# Patient Record
Sex: MALE | Race: WHITE | Employment: OTHER | URBAN - NONMETROPOLITAN AREA
[De-identification: names, ages, dates, MRNs, and addresses within clinical notes are randomized per-mention and may not be internally consistent; named-entity substitution may affect disease eponyms.]

---

## 2020-09-09 ENCOUNTER — APPOINTMENT (OUTPATIENT)
Dept: GENERAL RADIOLOGY | Age: 55
End: 2020-09-09
Payer: MEDICAID

## 2020-09-09 ENCOUNTER — HOSPITAL ENCOUNTER (EMERGENCY)
Age: 55
Discharge: HOME | End: 2020-09-09
Attending: PEDIATRICS
Payer: MEDICAID

## 2020-09-09 VITALS
DIASTOLIC BLOOD PRESSURE: 72 MMHG | HEART RATE: 79 BPM | WEIGHT: 30 LBS | BODY MASS INDEX: 4.3 KG/M2 | RESPIRATION RATE: 15 BRPM | TEMPERATURE: 98 F | OXYGEN SATURATION: 92 % | HEIGHT: 70 IN | SYSTOLIC BLOOD PRESSURE: 129 MMHG

## 2020-09-09 PROCEDURE — 93005 ELECTROCARDIOGRAM TRACING: CPT | Performed by: EMERGENCY MEDICINE

## 2020-09-09 PROCEDURE — 99285 EMERGENCY DEPT VISIT HI MDM: CPT

## 2020-09-09 PROCEDURE — 71101 X-RAY EXAM UNILAT RIBS/CHEST: CPT

## 2020-09-09 PROCEDURE — 99999 PR OFFICE/OUTPT VISIT,PROCEDURE ONLY: CPT | Performed by: PEDIATRICS

## 2020-09-09 RX ORDER — IBUPROFEN 800 MG/1
800 TABLET ORAL EVERY 6 HOURS PRN
Qty: 20 TABLET | Refills: 0 | Status: SHIPPED | OUTPATIENT
Start: 2020-09-09

## 2020-09-09 ASSESSMENT — PAIN DESCRIPTION - LOCATION: LOCATION: RIB CAGE

## 2020-09-09 ASSESSMENT — PAIN SCALES - GENERAL: PAINLEVEL_OUTOF10: 4

## 2020-09-09 ASSESSMENT — PAIN DESCRIPTION - ORIENTATION: ORIENTATION: RIGHT

## 2020-09-10 LAB
EKG P AXIS: 68 DEGREES
EKG P-R INTERVAL: 142 MS
EKG Q-T INTERVAL: 352 MS
EKG QRS DURATION: 88 MS
EKG QTC CALCULATION (BAZETT): 408 MS
EKG T AXIS: 53 DEGREES

## 2020-09-10 ASSESSMENT — ENCOUNTER SYMPTOMS
RHINORRHEA: 0
BACK PAIN: 0
COLOR CHANGE: 0
COUGH: 0
VOMITING: 0
EYE DISCHARGE: 0
SHORTNESS OF BREATH: 0
ABDOMINAL PAIN: 0
NAUSEA: 0

## 2020-09-10 NOTE — ED NOTES
Bed: 17  Expected date: 9/9/20  Expected time:   Means of arrival: [facility]  Comments:  [facility] rib pain x 3 days     Trevor Colmenares RN  09/09/20 2001
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
## 2021-05-14 ENCOUNTER — APPOINTMENT (OUTPATIENT)
Dept: CT IMAGING | Facility: HOSPITAL | Age: 56
End: 2021-05-14

## 2021-05-14 ENCOUNTER — HOSPITAL ENCOUNTER (EMERGENCY)
Facility: HOSPITAL | Age: 56
Discharge: HOME | End: 2021-05-14
Attending: EMERGENCY MEDICINE | Admitting: EMERGENCY MEDICINE

## 2021-05-14 VITALS
HEART RATE: 68 BPM | WEIGHT: 246 LBS | RESPIRATION RATE: 15 BRPM | HEIGHT: 71 IN | DIASTOLIC BLOOD PRESSURE: 85 MMHG | BODY MASS INDEX: 34.44 KG/M2 | TEMPERATURE: 98 F | OXYGEN SATURATION: 97 % | SYSTOLIC BLOOD PRESSURE: 143 MMHG

## 2021-05-14 DIAGNOSIS — K42.9 PERIUMBILICAL HERNIA: Primary | ICD-10-CM

## 2021-05-14 DIAGNOSIS — K40.90 UNILATERAL INGUINAL HERNIA WITHOUT OBSTRUCTION OR GANGRENE, RECURRENCE NOT SPECIFIED: ICD-10-CM

## 2021-05-14 LAB
ALBUMIN SERPL-MCNC: 3.6 G/DL (ref 3.5–5.2)
ALBUMIN/GLOB SERPL: 1.2 G/DL
ALP SERPL-CCNC: 79 U/L (ref 39–117)
ALT SERPL W P-5'-P-CCNC: 31 U/L (ref 1–41)
ANION GAP SERPL CALCULATED.3IONS-SCNC: 7 MMOL/L (ref 5–15)
AST SERPL-CCNC: 35 U/L (ref 1–40)
BASOPHILS # BLD AUTO: 0.05 10*3/MM3 (ref 0–0.2)
BASOPHILS NFR BLD AUTO: 0.6 % (ref 0–1.5)
BILIRUB SERPL-MCNC: 0.7 MG/DL (ref 0–1.2)
BUN SERPL-MCNC: 16 MG/DL (ref 6–20)
BUN/CREAT SERPL: 16.2 (ref 7–25)
CALCIUM SPEC-SCNC: 9 MG/DL (ref 8.6–10.5)
CHLORIDE SERPL-SCNC: 106 MMOL/L (ref 98–107)
CO2 SERPL-SCNC: 26 MMOL/L (ref 22–29)
CREAT SERPL-MCNC: 0.99 MG/DL (ref 0.76–1.27)
D-LACTATE SERPL-SCNC: 0.8 MMOL/L (ref 0.5–2)
DEPRECATED RDW RBC AUTO: 42.7 FL (ref 37–54)
EOSINOPHIL # BLD AUTO: 0.14 10*3/MM3 (ref 0–0.4)
EOSINOPHIL NFR BLD AUTO: 1.6 % (ref 0.3–6.2)
ERYTHROCYTE [DISTWIDTH] IN BLOOD BY AUTOMATED COUNT: 13.1 % (ref 12.3–15.4)
GFR SERPL CREATININE-BSD FRML MDRD: 78 ML/MIN/1.73
GLOBULIN UR ELPH-MCNC: 3.1 GM/DL
GLUCOSE SERPL-MCNC: 98 MG/DL (ref 65–99)
HCT VFR BLD AUTO: 47.3 % (ref 37.5–51)
HGB BLD-MCNC: 16 G/DL (ref 13–17.7)
IMM GRANULOCYTES # BLD AUTO: 0.03 10*3/MM3 (ref 0–0.05)
IMM GRANULOCYTES NFR BLD AUTO: 0.3 % (ref 0–0.5)
LIPASE SERPL-CCNC: 44 U/L (ref 13–60)
LYMPHOCYTES # BLD AUTO: 2.06 10*3/MM3 (ref 0.7–3.1)
LYMPHOCYTES NFR BLD AUTO: 23.1 % (ref 19.6–45.3)
MCH RBC QN AUTO: 30 PG (ref 26.6–33)
MCHC RBC AUTO-ENTMCNC: 33.8 G/DL (ref 31.5–35.7)
MCV RBC AUTO: 88.7 FL (ref 79–97)
MONOCYTES # BLD AUTO: 0.57 10*3/MM3 (ref 0.1–0.9)
MONOCYTES NFR BLD AUTO: 6.4 % (ref 5–12)
NEUTROPHILS NFR BLD AUTO: 6.06 10*3/MM3 (ref 1.7–7)
NEUTROPHILS NFR BLD AUTO: 68 % (ref 42.7–76)
NRBC BLD AUTO-RTO: 0 /100 WBC (ref 0–0.2)
PLATELET # BLD AUTO: 175 10*3/MM3 (ref 140–450)
PMV BLD AUTO: 11.3 FL (ref 6–12)
POTASSIUM SERPL-SCNC: 4.2 MMOL/L (ref 3.5–5.2)
PROT SERPL-MCNC: 6.7 G/DL (ref 6–8.5)
RBC # BLD AUTO: 5.33 10*6/MM3 (ref 4.14–5.8)
SODIUM SERPL-SCNC: 139 MMOL/L (ref 136–145)
WBC # BLD AUTO: 8.91 10*3/MM3 (ref 3.4–10.8)

## 2021-05-14 PROCEDURE — 80053 COMPREHEN METABOLIC PANEL: CPT | Performed by: EMERGENCY MEDICINE

## 2021-05-14 PROCEDURE — 83690 ASSAY OF LIPASE: CPT | Performed by: EMERGENCY MEDICINE

## 2021-05-14 PROCEDURE — 25010000002 IOPAMIDOL 61 % SOLUTION: Performed by: EMERGENCY MEDICINE

## 2021-05-14 PROCEDURE — 74177 CT ABD & PELVIS W/CONTRAST: CPT

## 2021-05-14 PROCEDURE — 99283 EMERGENCY DEPT VISIT LOW MDM: CPT

## 2021-05-14 PROCEDURE — 83605 ASSAY OF LACTIC ACID: CPT | Performed by: EMERGENCY MEDICINE

## 2021-05-14 PROCEDURE — 85025 COMPLETE CBC W/AUTO DIFF WBC: CPT | Performed by: EMERGENCY MEDICINE

## 2021-05-14 RX ORDER — ACETAMINOPHEN 500 MG
1000 TABLET ORAL EVERY 6 HOURS PRN
Qty: 25 TABLET | Refills: 0 | Status: SHIPPED | OUTPATIENT
Start: 2021-05-14 | End: 2021-05-21

## 2021-05-14 RX ORDER — SODIUM CHLORIDE 0.9 % (FLUSH) 0.9 %
10 SYRINGE (ML) INJECTION AS NEEDED
Status: DISCONTINUED | OUTPATIENT
Start: 2021-05-14 | End: 2021-05-14 | Stop reason: HOSPADM

## 2021-05-14 RX ORDER — IBUPROFEN 800 MG/1
800 TABLET ORAL EVERY 8 HOURS PRN
Qty: 25 TABLET | Refills: 0 | Status: SHIPPED | OUTPATIENT
Start: 2021-05-14 | End: 2021-05-22

## 2021-05-14 RX ADMIN — SODIUM CHLORIDE, POTASSIUM CHLORIDE, SODIUM LACTATE AND CALCIUM CHLORIDE 1000 ML: 600; 310; 30; 20 INJECTION, SOLUTION INTRAVENOUS at 13:35

## 2021-05-14 RX ADMIN — IOPAMIDOL 100 ML: 612 INJECTION, SOLUTION INTRAVENOUS at 14:26

## 2021-06-03 ENCOUNTER — TELEPHONE (OUTPATIENT)
Dept: INTERNAL MEDICINE | Facility: CLINIC | Age: 56
End: 2021-06-03

## 2021-06-03 ENCOUNTER — TRANSCRIBE ORDERS (OUTPATIENT)
Dept: ADMINISTRATIVE | Facility: HOSPITAL | Age: 56
End: 2021-06-03

## 2021-06-03 NOTE — TELEPHONE ENCOUNTER
Caller: Arsen Calderón    Relationship to patient: Self    Best call back number: [phone]     Patient is needing: PATIENT CALLED HAS A NEW PATIENT APPT 06/04. PATIENT HAS TO HAVE A PAPER FAXED TO THE  OFFICE PROOF OF HIS UPCOMING APPT, IF POSSIBLE.     LORNE RODRIGUES  FAX: [phone]

## 2021-06-04 ENCOUNTER — ANESTHESIA EVENT (OUTPATIENT)
Dept: PERIOP | Facility: HOSPITAL | Age: 56
End: 2021-06-04

## 2021-06-07 ENCOUNTER — TRANSCRIBE ORDERS (OUTPATIENT)
Dept: ADMINISTRATIVE | Facility: HOSPITAL | Age: 56
End: 2021-06-07

## 2021-06-07 ENCOUNTER — APPOINTMENT (OUTPATIENT)
Dept: LAB | Facility: HOSPITAL | Age: 56
End: 2021-06-07

## 2021-06-07 DIAGNOSIS — Z11.59 SCREENING FOR VIRAL DISEASE: Primary | ICD-10-CM

## 2021-06-08 ENCOUNTER — APPOINTMENT (OUTPATIENT)
Dept: LAB | Facility: HOSPITAL | Age: 56
End: 2021-06-08

## 2021-06-09 ENCOUNTER — OFFICE VISIT (OUTPATIENT)
Dept: INTERNAL MEDICINE | Facility: CLINIC | Age: 56
End: 2021-06-09

## 2021-06-09 ENCOUNTER — LAB (OUTPATIENT)
Dept: LAB | Facility: HOSPITAL | Age: 56
End: 2021-06-09

## 2021-06-09 ENCOUNTER — PRE-ADMISSION TESTING (OUTPATIENT)
Dept: PREADMISSION TESTING | Facility: HOSPITAL | Age: 56
End: 2021-06-09

## 2021-06-09 VITALS
OXYGEN SATURATION: 98 % | RESPIRATION RATE: 20 BRPM | WEIGHT: 248.9 LBS | SYSTOLIC BLOOD PRESSURE: 153 MMHG | HEIGHT: 70 IN | HEART RATE: 72 BPM | DIASTOLIC BLOOD PRESSURE: 84 MMHG | BODY MASS INDEX: 35.63 KG/M2

## 2021-06-09 VITALS
RESPIRATION RATE: 17 BRPM | SYSTOLIC BLOOD PRESSURE: 160 MMHG | HEIGHT: 70 IN | OXYGEN SATURATION: 98 % | TEMPERATURE: 97.5 F | BODY MASS INDEX: 35.75 KG/M2 | WEIGHT: 249.7 LBS | HEART RATE: 71 BPM | DIASTOLIC BLOOD PRESSURE: 92 MMHG

## 2021-06-09 DIAGNOSIS — M06.9 RHEUMATOID ARTHRITIS, INVOLVING UNSPECIFIED SITE, UNSPECIFIED WHETHER RHEUMATOID FACTOR PRESENT (HCC): ICD-10-CM

## 2021-06-09 DIAGNOSIS — F20.0 PARANOID SCHIZOPHRENIA (HCC): ICD-10-CM

## 2021-06-09 DIAGNOSIS — E03.9 HYPOTHYROIDISM, UNSPECIFIED TYPE: ICD-10-CM

## 2021-06-09 DIAGNOSIS — F31.9 BIPOLAR 1 DISORDER (HCC): ICD-10-CM

## 2021-06-09 DIAGNOSIS — B18.2 CHRONIC HEPATITIS C WITHOUT HEPATIC COMA (HCC): ICD-10-CM

## 2021-06-09 DIAGNOSIS — I10 ESSENTIAL HYPERTENSION: Primary | ICD-10-CM

## 2021-06-09 DIAGNOSIS — Z01.818 PRE-OP EXAM: ICD-10-CM

## 2021-06-09 DIAGNOSIS — I10 ESSENTIAL HYPERTENSION: ICD-10-CM

## 2021-06-09 DIAGNOSIS — J44.9 CHRONIC OBSTRUCTIVE PULMONARY DISEASE, UNSPECIFIED COPD TYPE (HCC): ICD-10-CM

## 2021-06-09 DIAGNOSIS — F44.81: ICD-10-CM

## 2021-06-09 LAB
ALBUMIN SERPL-MCNC: 3.8 G/DL (ref 3.5–5.2)
ALBUMIN/GLOB SERPL: 1.3 G/DL
ALP SERPL-CCNC: 98 U/L (ref 39–117)
ALT SERPL W P-5'-P-CCNC: 35 U/L (ref 1–41)
ANION GAP SERPL CALCULATED.3IONS-SCNC: 10 MMOL/L (ref 5–15)
AST SERPL-CCNC: 31 U/L (ref 1–40)
BASOPHILS # BLD AUTO: 0.05 10*3/MM3 (ref 0–0.2)
BASOPHILS NFR BLD AUTO: 0.6 % (ref 0–1.5)
BILIRUB SERPL-MCNC: 0.3 MG/DL (ref 0–1.2)
BUN SERPL-MCNC: 13 MG/DL (ref 6–20)
BUN/CREAT SERPL: 14.9 (ref 7–25)
CALCIUM SPEC-SCNC: 9.4 MG/DL (ref 8.6–10.5)
CHLORIDE SERPL-SCNC: 105 MMOL/L (ref 98–107)
CHOLEST SERPL-MCNC: 114 MG/DL (ref 0–200)
CO2 SERPL-SCNC: 26 MMOL/L (ref 22–29)
CREAT SERPL-MCNC: 0.87 MG/DL (ref 0.76–1.27)
CRP SERPL-MCNC: <0.3 MG/DL (ref 0–0.5)
DEPRECATED RDW RBC AUTO: 43.2 FL (ref 37–54)
EOSINOPHIL # BLD AUTO: 0.25 10*3/MM3 (ref 0–0.4)
EOSINOPHIL NFR BLD AUTO: 3.2 % (ref 0.3–6.2)
ERYTHROCYTE [DISTWIDTH] IN BLOOD BY AUTOMATED COUNT: 13.1 % (ref 12.3–15.4)
ERYTHROCYTE [SEDIMENTATION RATE] IN BLOOD: 2 MM/HR (ref 0–15)
GFR SERPL CREATININE-BSD FRML MDRD: 91 ML/MIN/1.73
GLOBULIN UR ELPH-MCNC: 3 GM/DL
GLUCOSE SERPL-MCNC: 189 MG/DL (ref 65–99)
HBA1C MFR BLD: 5.2 % (ref 4.8–5.6)
HCT VFR BLD AUTO: 51.4 % (ref 37.5–51)
HDLC SERPL-MCNC: 42 MG/DL (ref 40–60)
HGB BLD-MCNC: 17.3 G/DL (ref 13–17.7)
IMM GRANULOCYTES # BLD AUTO: 0.03 10*3/MM3 (ref 0–0.05)
IMM GRANULOCYTES NFR BLD AUTO: 0.4 % (ref 0–0.5)
LDLC SERPL CALC-MCNC: 55 MG/DL (ref 0–100)
LDLC/HDLC SERPL: 1.3 {RATIO}
LYMPHOCYTES # BLD AUTO: 1.74 10*3/MM3 (ref 0.7–3.1)
LYMPHOCYTES NFR BLD AUTO: 22.1 % (ref 19.6–45.3)
MCH RBC QN AUTO: 30.4 PG (ref 26.6–33)
MCHC RBC AUTO-ENTMCNC: 33.7 G/DL (ref 31.5–35.7)
MCV RBC AUTO: 90.2 FL (ref 79–97)
MONOCYTES # BLD AUTO: 0.43 10*3/MM3 (ref 0.1–0.9)
MONOCYTES NFR BLD AUTO: 5.5 % (ref 5–12)
NEUTROPHILS NFR BLD AUTO: 5.37 10*3/MM3 (ref 1.7–7)
NEUTROPHILS NFR BLD AUTO: 68.2 % (ref 42.7–76)
NRBC BLD AUTO-RTO: 0 /100 WBC (ref 0–0.2)
PLATELET # BLD AUTO: 166 10*3/MM3 (ref 140–450)
PMV BLD AUTO: 12.2 FL (ref 6–12)
POTASSIUM SERPL-SCNC: 4.4 MMOL/L (ref 3.5–5.2)
PROT SERPL-MCNC: 6.8 G/DL (ref 6–8.5)
RBC # BLD AUTO: 5.7 10*6/MM3 (ref 4.14–5.8)
SARS-COV-2 RNA PNL SPEC NAA+PROBE: NOT DETECTED
SODIUM SERPL-SCNC: 141 MMOL/L (ref 136–145)
TRIGL SERPL-MCNC: 87 MG/DL (ref 0–150)
TSH SERPL DL<=0.05 MIU/L-ACNC: 1.3 UIU/ML (ref 0.27–4.2)
VLDLC SERPL-MCNC: 17 MG/DL (ref 5–40)
WBC # BLD AUTO: 7.87 10*3/MM3 (ref 3.4–10.8)

## 2021-06-09 PROCEDURE — C9803 HOPD COVID-19 SPEC COLLECT: HCPCS | Performed by: STUDENT IN AN ORGANIZED HEALTH CARE EDUCATION/TRAINING PROGRAM

## 2021-06-09 PROCEDURE — 86200 CCP ANTIBODY: CPT

## 2021-06-09 PROCEDURE — 86140 C-REACTIVE PROTEIN: CPT

## 2021-06-09 PROCEDURE — 84443 ASSAY THYROID STIM HORMONE: CPT

## 2021-06-09 PROCEDURE — 80053 COMPREHEN METABOLIC PANEL: CPT

## 2021-06-09 PROCEDURE — 99204 OFFICE O/P NEW MOD 45 MIN: CPT | Performed by: INTERNAL MEDICINE

## 2021-06-09 PROCEDURE — 99406 BEHAV CHNG SMOKING 3-10 MIN: CPT | Performed by: INTERNAL MEDICINE

## 2021-06-09 PROCEDURE — 86431 RHEUMATOID FACTOR QUANT: CPT

## 2021-06-09 PROCEDURE — 87635 SARS-COV-2 COVID-19 AMP PRB: CPT | Performed by: STUDENT IN AN ORGANIZED HEALTH CARE EDUCATION/TRAINING PROGRAM

## 2021-06-09 PROCEDURE — 36415 COLL VENOUS BLD VENIPUNCTURE: CPT

## 2021-06-09 PROCEDURE — 83036 HEMOGLOBIN GLYCOSYLATED A1C: CPT

## 2021-06-09 PROCEDURE — 80061 LIPID PANEL: CPT

## 2021-06-09 PROCEDURE — 85025 COMPLETE CBC W/AUTO DIFF WBC: CPT

## 2021-06-09 PROCEDURE — 85651 RBC SED RATE NONAUTOMATED: CPT

## 2021-06-09 RX ORDER — SENNOSIDES 8.6 MG
650 CAPSULE ORAL EVERY 8 HOURS PRN
COMMUNITY
End: 2021-06-09

## 2021-06-09 RX ORDER — IBUPROFEN 800 MG/1
800 TABLET ORAL EVERY 6 HOURS PRN
COMMUNITY
End: 2021-06-09

## 2021-06-09 RX ORDER — ALBUTEROL SULFATE 90 UG/1
2 AEROSOL, METERED RESPIRATORY (INHALATION) EVERY 4 HOURS PRN
Qty: 18 G | Refills: 5 | Status: SHIPPED | OUTPATIENT
Start: 2021-06-09

## 2021-06-09 RX ORDER — HYDROCHLOROTHIAZIDE 25 MG/1
25 TABLET ORAL DAILY
Qty: 30 TABLET | Refills: 2 | Status: SHIPPED | OUTPATIENT
Start: 2021-06-09

## 2021-06-09 NOTE — H&P (VIEW-ONLY)
"Chief Complaint   Patient presents with   • Establish Care   • Medications         History:  Arsen Calderón is a 56 y.o. male who presents today for evaluation of the above problems.      He presents today to establish care.  He has hypertension, rheumatoid arthritis, weakness, abdominal hernia, hepatitis C, and COPD among other issues.    He presented to the emergency room on May 14, 2021 for complaint of hernia.  Work-up showed fat-containing periumbilical hernia with possible fat necrosis and inflammation as well as a right inguinal hernia.  He was encouraged to follow-up with general surgery.  He is scheduled for robotic umbilical hernia repair with possible mesh, robotic right inguinal hernia repair with possible mesh with Dr. Jean tomorrow, Ivana 10, 2021    He reports pain follow-up hernias which have been causing nausea vomiting and bilateral testicular pain.    He got out of long term about 14 months ago.  He spent a total of 26 years in long term, 17 years was spent in long term in a row    He has had a past medical history of hypertension and is not currently on any antihypertensive medication.  He was on lisinopril previously which paradoxically increased blood pressure.    He reports having thyroid issues and was on medicine but does not remember what it was.    He has COPD and needs his albuterol inhaler    He is also has rheumatoid arthritis with diffuse joint pain.  Previously was seeing pain management and would like referral back.    He states that he has hepatitis C and is in \"in remission\".  He was not treated    He also has multiple personality disorder, bipolar 1 disorder, paranoid schizophrenia, anxiety.  He is in need to establish care with a psychiatrist as he is not currently on any medications    He has received his [employer] COVID-19 vaccine    I reviewed EKG and [facility] on June 2.  EKG was normal sinus rhythm and EKG did not show any active " disease.  He has not had any issues with anesthesia.  He has no chest pain, palpitations.  He has baseline shortness of breath and wheezing from COPD.  Has not had heart attack or stroke    He is smoking 1 pack of cigarettes per day and is wanting to quit.  His quit date is today.      HPI     Social History     Socioeconomic History   • Marital status: Single     Spouse name: Not on file   • Number of children: Not on file   • Years of education: Not on file   • Highest education level: Not on file   Tobacco Use   • Smoking status: Current Every Day Smoker     Packs/day: 1.00     Years: 21.00     Pack years: 21.00     Types: Cigarettes   • Smokeless tobacco: Never Used   Vaping Use   • Vaping Use: Every day   Substance and Sexual Activity   • Alcohol use: Never   • Drug use: Yes     Types: Marijuana     Comment: FOR PAIN   • Sexual activity: Yes         ROS:  Review of Systems  See above    Current Outpatient Medications:   •  albuterol sulfate  (90 Base) MCG/ACT inhaler, Inhale 2 puffs Every 4 (Four) Hours As Needed for Wheezing., Disp: 18 g, Rfl: 5  •  hydroCHLOROthiazide (HYDRODIURIL) 25 MG tablet, Take 1 tablet by mouth Daily., Disp: 30 tablet, Rfl: 2    Lab Results   Component Value Date    GLUCOSE 189 (H) 06/09/2021    BUN 13 06/09/2021    CREATININE 0.87 06/09/2021    EGFRIFNONA 91 06/09/2021    BCR 14.9 06/09/2021    K 4.4 06/09/2021    CO2 26.0 06/09/2021    CALCIUM 9.4 06/09/2021    ALBUMIN 3.80 06/09/2021    AST 31 06/09/2021    ALT 35 06/09/2021       WBC   Date Value Ref Range Status   06/09/2021 7.87 3.40 - 10.80 10*3/mm3 Final     RBC   Date Value Ref Range Status   06/09/2021 5.70 4.14 - 5.80 10*6/mm3 Final     Hemoglobin   Date Value Ref Range Status   06/09/2021 17.3 13.0 - 17.7 g/dL Final     Hematocrit   Date Value Ref Range Status   06/09/2021 51.4 (H) 37.5 - 51.0 % Final     MCV   Date Value Ref Range Status   06/09/2021 90.2 79.0 - 97.0 fL Final     MCH   Date Value Ref Range Status  "  06/09/2021 30.4 26.6 - 33.0 pg Final     MCHC   Date Value Ref Range Status   06/09/2021 33.7 31.5 - 35.7 g/dL Final     RDW   Date Value Ref Range Status   06/09/2021 13.1 12.3 - 15.4 % Final     RDW-SD   Date Value Ref Range Status   06/09/2021 43.2 37.0 - 54.0 fl Final     MPV   Date Value Ref Range Status   06/09/2021 12.2 (H) 6.0 - 12.0 fL Final     Platelets   Date Value Ref Range Status   06/09/2021 166 140 - 450 10*3/mm3 Final     Neutrophil %   Date Value Ref Range Status   06/09/2021 68.2 42.7 - 76.0 % Final     Lymphocyte %   Date Value Ref Range Status   06/09/2021 22.1 19.6 - 45.3 % Final     Monocyte %   Date Value Ref Range Status   06/09/2021 5.5 5.0 - 12.0 % Final     Eosinophil %   Date Value Ref Range Status   06/09/2021 3.2 0.3 - 6.2 % Final     Basophil %   Date Value Ref Range Status   06/09/2021 0.6 0.0 - 1.5 % Final     Immature Grans %   Date Value Ref Range Status   06/09/2021 0.4 0.0 - 0.5 % Final     Neutrophils, Absolute   Date Value Ref Range Status   06/09/2021 5.37 1.70 - 7.00 10*3/mm3 Final     Lymphocytes, Absolute   Date Value Ref Range Status   06/09/2021 1.74 0.70 - 3.10 10*3/mm3 Final     Monocytes, Absolute   Date Value Ref Range Status   06/09/2021 0.43 0.10 - 0.90 10*3/mm3 Final     Eosinophils, Absolute   Date Value Ref Range Status   06/09/2021 0.25 0.00 - 0.40 10*3/mm3 Final     Basophils, Absolute   Date Value Ref Range Status   06/09/2021 0.05 0.00 - 0.20 10*3/mm3 Final     Immature Grans, Absolute   Date Value Ref Range Status   06/09/2021 0.03 0.00 - 0.05 10*3/mm3 Final     nRBC   Date Value Ref Range Status   06/09/2021 0.0 0.0 - 0.2 /100 WBC Final         OBJECTIVE:  Visit Vitals  /92 (BP Location: Left arm, Patient Position: Sitting, Cuff Size: Adult)   Pulse 71   Temp 97.5 °F (36.4 °C) (Oral)   Resp 17   Ht 179 cm (70.47\")   Wt 113 kg (249 lb 11.2 oz)   SpO2 98%   BMI 35.35 kg/m²      Physical Exam  Vitals reviewed.   Constitutional:       General: He is " not in acute distress.     Appearance: Normal appearance. He is well-developed.      Comments: Pleasant   HENT:      Head: Normocephalic and atraumatic.   Eyes:      Pupils: Pupils are equal, round, and reactive to light.   Neck:      Thyroid: No thyromegaly.      Vascular: No carotid bruit.      Trachea: Phonation normal.   Cardiovascular:      Rate and Rhythm: Normal rate and regular rhythm.      Heart sounds: No murmur heard.     Pulmonary:      Effort: Pulmonary effort is normal. No respiratory distress.      Breath sounds: Normal breath sounds. No stridor. No wheezing, rhonchi or rales.   Abdominal:      Hernia: A hernia is present.   Skin:     General: Skin is warm and dry.      Coloration: Skin is not jaundiced or pale.      Findings: No bruising or erythema.   Neurological:      Mental Status: He is alert.   Psychiatric:         Mood and Affect: Mood normal. Affect is tearful (At times).         Speech: Speech normal.         Behavior: Behavior normal.         Thought Content: Thought content normal. Thought content is not paranoid (Does not appear paranoid today).         Cognition and Memory: Cognition normal.         Judgment: Judgment normal.         Assessment/Plan    Diagnoses and all orders for this visit:    1. Essential hypertension (Primary)  -     Lipid Panel; Future  -     hydroCHLOROthiazide (HYDRODIURIL) 25 MG tablet; Take 1 tablet by mouth Daily.  Dispense: 30 tablet; Refill: 2  -     Hemoglobin A1c; Future    2. Hypothyroidism, unspecified type  -     TSH; Future    3. Chronic obstructive pulmonary disease, unspecified COPD type (CMS/HCC)  -     albuterol sulfate  (90 Base) MCG/ACT inhaler; Inhale 2 puffs Every 4 (Four) Hours As Needed for Wheezing.  Dispense: 18 g; Refill: 5  -     XR Chest PA & Lateral; Future  -     Hemoglobin A1c; Future    4. Bipolar 1 disorder (CMS/HCC)  -     Cancel: Ambulatory Referral to Psychiatry  -     Ambulatory Referral to Psychiatry    5. Multiple  personality (CMS/HCC)  -     Cancel: Ambulatory Referral to Psychiatry  -     Ambulatory Referral to Psychiatry    6. Paranoid schizophrenia (CMS/HCC)  -     Cancel: Ambulatory Referral to Psychiatry  -     Ambulatory Referral to Psychiatry    7. Chronic hepatitis C without hepatic coma (CMS/HCC)  -     Hepatitis C RNA, Quantitative, PCR (graph); Future  -     Hepatitis C Antibody; Future    8. Pre-op exam  -     Cancel: ECG 12 Lead    9. Rheumatoid arthritis, involving unspecified site, unspecified whether rheumatoid factor present (CMS/HCC)  -     Cyclic Citrul Peptide Antibody, IgG / IgA; Future  -     Sedimentation Rate; Future  -     Rheumatoid Factor; Future  -     C-reactive Protein; Future  -     Ambulatory Referral to Pain Management      His blood pressure is uncontrolled at 160/92.  He did not do well with lisinopril in the past.  Morning sent over hydrochlorothiazide 25 mg daily, but I would like him to start after he has surgery, likely a couple days afterwards depending on how he is feeling.    He is planned for robotic laparoscopic abdominal and right inguinal hernia surgery tomorrow with Dr. Jean.  Reviewed his recent labs, EKG and chest x-ray.  Based on these results and the patient's symptoms I do not believe any further work-up is necessary at this time prior to surgery, and he would be a low risk for cardiac complication    Obtain the above labs.    Refill his albuterol    Obtain TSH and if necessary we will start the appropriate dose of Synthroid    Refer to psychiatry for paranoid schizophrenia, multiple personality disorder bipolar 1 disorder    Refer to pain management and obtain the rheumatoid arthritis labs above.    Arsen Calderón  reports that he has been smoking cigarettes. He has a 21.00 pack-year smoking history. He has never used smokeless tobacco.. I have educated him on the risk of diseases from using tobacco products such as cancer, COPD and heart disease.     I advised him to  quit and he is willing to quit. We have discussed the following method/s for tobacco cessation:  [place].  Together we have set a quit date for Today.  He will follow up with me in 1 month or sooner to check on his progress.    I spent 3.5 minutes counseling the patient.      nReturn in about 4 weeks (around 7/7/2021) for Recheck BP.    Patient was given instructions and counseling regarding his/her condition or for health maintenance advice. Please see specific information pulled into the AVS if appropriate.     CHRISTOPHER Cuadra MD   13:29 CDT  6/9/2021

## 2021-06-09 NOTE — PROGRESS NOTES
"Chief Complaint   Patient presents with   • Establish Care   • Medications         History:  Arsen Calderón is a 56 y.o. male who presents today for evaluation of the above problems.      He presents today to establish care.  He has hypertension, rheumatoid arthritis, weakness, abdominal hernia, hepatitis C, and COPD among other issues.    He presented to the emergency room on May 14, 2021 for complaint of hernia.  Work-up showed fat-containing periumbilical hernia with possible fat necrosis and inflammation as well as a right inguinal hernia.  He was encouraged to follow-up with general surgery.  He is scheduled for robotic umbilical hernia repair with possible mesh, robotic right inguinal hernia repair with possible mesh with Dr. Jean tomorrow, Ivana 10, 2021    He reports pain follow-up hernias which have been causing nausea vomiting and bilateral testicular pain.    He got out of senior care about 14 months ago.  He spent a total of 26 years in senior care, 17 years was spent in senior care in a row    He has had a past medical history of hypertension and is not currently on any antihypertensive medication.  He was on lisinopril previously which paradoxically increased blood pressure.    He reports having thyroid issues and was on medicine but does not remember what it was.    He has COPD and needs his albuterol inhaler    He is also has rheumatoid arthritis with diffuse joint pain.  Previously was seeing pain management and would like referral back.    He states that he has hepatitis C and is in \"in remission\".  He was not treated    He also has multiple personality disorder, bipolar 1 disorder, paranoid schizophrenia, anxiety.  He is in need to establish care with a psychiatrist as he is not currently on any medications    He has received his [employer] COVID-19 vaccine    I reviewed EKG and [facility] on June 2.  EKG was normal sinus rhythm and EKG did not show any active " disease.  He has not had any issues with anesthesia.  He has no chest pain, palpitations.  He has baseline shortness of breath and wheezing from COPD.  Has not had heart attack or stroke    He is smoking 1 pack of cigarettes per day and is wanting to quit.  His quit date is today.      HPI     Social History     Socioeconomic History   • Marital status: Single     Spouse name: Not on file   • Number of children: Not on file   • Years of education: Not on file   • Highest education level: Not on file   Tobacco Use   • Smoking status: Current Every Day Smoker     Packs/day: 1.00     Years: 21.00     Pack years: 21.00     Types: Cigarettes   • Smokeless tobacco: Never Used   Vaping Use   • Vaping Use: Every day   Substance and Sexual Activity   • Alcohol use: Never   • Drug use: Yes     Types: Marijuana     Comment: FOR PAIN   • Sexual activity: Yes         ROS:  Review of Systems  See above    Current Outpatient Medications:   •  albuterol sulfate  (90 Base) MCG/ACT inhaler, Inhale 2 puffs Every 4 (Four) Hours As Needed for Wheezing., Disp: 18 g, Rfl: 5  •  hydroCHLOROthiazide (HYDRODIURIL) 25 MG tablet, Take 1 tablet by mouth Daily., Disp: 30 tablet, Rfl: 2    Lab Results   Component Value Date    GLUCOSE 189 (H) 06/09/2021    BUN 13 06/09/2021    CREATININE 0.87 06/09/2021    EGFRIFNONA 91 06/09/2021    BCR 14.9 06/09/2021    K 4.4 06/09/2021    CO2 26.0 06/09/2021    CALCIUM 9.4 06/09/2021    ALBUMIN 3.80 06/09/2021    AST 31 06/09/2021    ALT 35 06/09/2021       WBC   Date Value Ref Range Status   06/09/2021 7.87 3.40 - 10.80 10*3/mm3 Final     RBC   Date Value Ref Range Status   06/09/2021 5.70 4.14 - 5.80 10*6/mm3 Final     Hemoglobin   Date Value Ref Range Status   06/09/2021 17.3 13.0 - 17.7 g/dL Final     Hematocrit   Date Value Ref Range Status   06/09/2021 51.4 (H) 37.5 - 51.0 % Final     MCV   Date Value Ref Range Status   06/09/2021 90.2 79.0 - 97.0 fL Final     MCH   Date Value Ref Range Status  "  06/09/2021 30.4 26.6 - 33.0 pg Final     MCHC   Date Value Ref Range Status   06/09/2021 33.7 31.5 - 35.7 g/dL Final     RDW   Date Value Ref Range Status   06/09/2021 13.1 12.3 - 15.4 % Final     RDW-SD   Date Value Ref Range Status   06/09/2021 43.2 37.0 - 54.0 fl Final     MPV   Date Value Ref Range Status   06/09/2021 12.2 (H) 6.0 - 12.0 fL Final     Platelets   Date Value Ref Range Status   06/09/2021 166 140 - 450 10*3/mm3 Final     Neutrophil %   Date Value Ref Range Status   06/09/2021 68.2 42.7 - 76.0 % Final     Lymphocyte %   Date Value Ref Range Status   06/09/2021 22.1 19.6 - 45.3 % Final     Monocyte %   Date Value Ref Range Status   06/09/2021 5.5 5.0 - 12.0 % Final     Eosinophil %   Date Value Ref Range Status   06/09/2021 3.2 0.3 - 6.2 % Final     Basophil %   Date Value Ref Range Status   06/09/2021 0.6 0.0 - 1.5 % Final     Immature Grans %   Date Value Ref Range Status   06/09/2021 0.4 0.0 - 0.5 % Final     Neutrophils, Absolute   Date Value Ref Range Status   06/09/2021 5.37 1.70 - 7.00 10*3/mm3 Final     Lymphocytes, Absolute   Date Value Ref Range Status   06/09/2021 1.74 0.70 - 3.10 10*3/mm3 Final     Monocytes, Absolute   Date Value Ref Range Status   06/09/2021 0.43 0.10 - 0.90 10*3/mm3 Final     Eosinophils, Absolute   Date Value Ref Range Status   06/09/2021 0.25 0.00 - 0.40 10*3/mm3 Final     Basophils, Absolute   Date Value Ref Range Status   06/09/2021 0.05 0.00 - 0.20 10*3/mm3 Final     Immature Grans, Absolute   Date Value Ref Range Status   06/09/2021 0.03 0.00 - 0.05 10*3/mm3 Final     nRBC   Date Value Ref Range Status   06/09/2021 0.0 0.0 - 0.2 /100 WBC Final         OBJECTIVE:  Visit Vitals  /92 (BP Location: Left arm, Patient Position: Sitting, Cuff Size: Adult)   Pulse 71   Temp 97.5 °F (36.4 °C) (Oral)   Resp 17   Ht 179 cm (70.47\")   Wt 113 kg (249 lb 11.2 oz)   SpO2 98%   BMI 35.35 kg/m²      Physical Exam  Vitals reviewed.   Constitutional:       General: He is " not in acute distress.     Appearance: Normal appearance. He is well-developed.      Comments: Pleasant   HENT:      Head: Normocephalic and atraumatic.   Eyes:      Pupils: Pupils are equal, round, and reactive to light.   Neck:      Thyroid: No thyromegaly.      Vascular: No carotid bruit.      Trachea: Phonation normal.   Cardiovascular:      Rate and Rhythm: Normal rate and regular rhythm.      Heart sounds: No murmur heard.     Pulmonary:      Effort: Pulmonary effort is normal. No respiratory distress.      Breath sounds: Normal breath sounds. No stridor. No wheezing, rhonchi or rales.   Abdominal:      Hernia: A hernia is present.   Skin:     General: Skin is warm and dry.      Coloration: Skin is not jaundiced or pale.      Findings: No bruising or erythema.   Neurological:      Mental Status: He is alert.   Psychiatric:         Mood and Affect: Mood normal. Affect is tearful (At times).         Speech: Speech normal.         Behavior: Behavior normal.         Thought Content: Thought content normal. Thought content is not paranoid (Does not appear paranoid today).         Cognition and Memory: Cognition normal.         Judgment: Judgment normal.         Assessment/Plan    Diagnoses and all orders for this visit:    1. Essential hypertension (Primary)  -     Lipid Panel; Future  -     hydroCHLOROthiazide (HYDRODIURIL) 25 MG tablet; Take 1 tablet by mouth Daily.  Dispense: 30 tablet; Refill: 2  -     Hemoglobin A1c; Future    2. Hypothyroidism, unspecified type  -     TSH; Future    3. Chronic obstructive pulmonary disease, unspecified COPD type (CMS/HCC)  -     albuterol sulfate  (90 Base) MCG/ACT inhaler; Inhale 2 puffs Every 4 (Four) Hours As Needed for Wheezing.  Dispense: 18 g; Refill: 5  -     XR Chest PA & Lateral; Future  -     Hemoglobin A1c; Future    4. Bipolar 1 disorder (CMS/HCC)  -     Cancel: Ambulatory Referral to Psychiatry  -     Ambulatory Referral to Psychiatry    5. Multiple  personality (CMS/HCC)  -     Cancel: Ambulatory Referral to Psychiatry  -     Ambulatory Referral to Psychiatry    6. Paranoid schizophrenia (CMS/HCC)  -     Cancel: Ambulatory Referral to Psychiatry  -     Ambulatory Referral to Psychiatry    7. Chronic hepatitis C without hepatic coma (CMS/HCC)  -     Hepatitis C RNA, Quantitative, PCR (graph); Future  -     Hepatitis C Antibody; Future    8. Pre-op exam  -     Cancel: ECG 12 Lead    9. Rheumatoid arthritis, involving unspecified site, unspecified whether rheumatoid factor present (CMS/HCC)  -     Cyclic Citrul Peptide Antibody, IgG / IgA; Future  -     Sedimentation Rate; Future  -     Rheumatoid Factor; Future  -     C-reactive Protein; Future  -     Ambulatory Referral to Pain Management      His blood pressure is uncontrolled at 160/92.  He did not do well with lisinopril in the past.  Morning sent over hydrochlorothiazide 25 mg daily, but I would like him to start after he has surgery, likely a couple days afterwards depending on how he is feeling.    He is planned for robotic laparoscopic abdominal and right inguinal hernia surgery tomorrow with Dr. Jean.  Reviewed his recent labs, EKG and chest x-ray.  Based on these results and the patient's symptoms I do not believe any further work-up is necessary at this time prior to surgery, and he would be a low risk for cardiac complication    Obtain the above labs.    Refill his albuterol    Obtain TSH and if necessary we will start the appropriate dose of Synthroid    Refer to psychiatry for paranoid schizophrenia, multiple personality disorder bipolar 1 disorder    Refer to pain management and obtain the rheumatoid arthritis labs above.    Arsen Calderón  reports that he has been smoking cigarettes. He has a 21.00 pack-year smoking history. He has never used smokeless tobacco.. I have educated him on the risk of diseases from using tobacco products such as cancer, COPD and heart disease.     I advised him to  quit and he is willing to quit. We have discussed the following method/s for tobacco cessation:  [place].  Together we have set a quit date for Today.  He will follow up with me in 1 month or sooner to check on his progress.    I spent 3.5 minutes counseling the patient.      nReturn in about 4 weeks (around 7/7/2021) for Recheck BP.    Patient was given instructions and counseling regarding his/her condition or for health maintenance advice. Please see specific information pulled into the AVS if appropriate.     CHRISTOPHER Cuadra MD   13:29 CDT  6/9/2021

## 2021-06-09 NOTE — DISCHARGE INSTRUCTIONS
DAY OF SURGERY INSTRUCTIONS        YOUR SURGEON: Dr. Molly Jean    PROCEDURE: Robotic umbilical hernia repair with possible mesh, robotic right inguinal hernia repair with possible mesh    DATE OF SURGERY: Thursday Ivana 10, 2021    ARRIVAL TIME: AS DIRECTED BY OFFICE    YOU MAY TAKE THE FOLLOWING MEDICATION(S) THE MORNING OF SURGERY WITH A SIP OF WATER: Tylenol if needed for pain      ALL OTHER HOME MEDICATION CHECK WITH YOUR PHYSICIAN      DO NOT TAKE ANY ERECTILE DYSFUNCTION MEDICATIONS (EX: CIALIS, VIAGRA) 24 HOURS PRIOR TO SURGERY                      MANAGING PAIN AFTER SURGERY    We know you are probably wondering what your pain will be like after surgery.  Following surgery it is unrealistic to expect you will not have pain.   Pain is how our bodies let us know that something is wrong or cautions us to be careful.  That said, our goal is to make your pain tolerable.    Methods we may use to treat your pain include (oral or IV medications, PCAs, epidurals, nerve blocks, etc.)   While some procedures require IV pain medications for a short time after surgery, transitioning to pain medications by mouth allows for better management of pain.   Your nurse will encourage you to take oral pain medications whenever possible.  IV medications work almost immediately, but only last a short while.  Taking medications by mouth allows for a more constant level of medication in your blood stream for a longer period of time.      Once your pain is out of control it is harder to get back under control.  It is important you are aware when your next dose of pain medication is due.  If you are admitted, your nurse may write the time of your next dose on the white board in your room to help you remember.      We are interested in your pain and encourage you to inform us about aggravating factors during your visit.   Many times a simple repositioning every few hours can make a big difference.    If your physician says it is  okay, do not let your pain prevent you from getting out of bed. Be sure to call your nurse for assistance prior to getting up so you do not fall.      Before surgery, please decide your tolerable pain goal.  These faces help describe the pain ratings we use on a 0-10 scale.   Be prepared to tell us your goal and whether or not you take pain or anxiety medications at home.          BEFORE YOU COME TO THE HOSPITAL  (Pre-op instructions)  • Do not eat, drink, smoke or chew gum after midnight the night before surgery.  This also includes no mints.  • Morning of surgery take only the medicines you have been instructed with a sip of water unless otherwise instructed  by your physician.  • Do not shave, wear makeup or dark nail polish.  • Remove all jewelry including rings.  • Leave anything you consider valuable at home.  • Leave your suitcase in the car until after your surgery.  • Bring the following with you if applicable:  o Picture ID and insurance, Medicare or Medicaid cards  o Co-pay/deductible required by insurance (cash, check, credit card)  o Copy of advance directive, living will or power-of- documents if not brought to PAT  o CPAP or BIPAP mask and tubing  o Relaxation aids ( book, magazine), etc.  o Hearing aids                        ON THE DAY OF SURGERY  · On the day of surgery check in at registration located at the main entrance of the hospital.   ? You will be registered and given a beeper with instructions where to wait in the main lobby.  ? When your beeper lights up and vibrates a member of the Outpatient Surgery staff will meet you at the double doors under the stair steps and escort you to your preoperative room.   · You may have cloth compression devices placed on your legs. These help to prevent blood clots and reduce swelling in your legs.  · An IV may be inserted into one of your veins.  · In the operating room, you may be given one or more of the following:  ? A medicine to help you  "relax (sedative).  ? A medicine to numb the area (local anesthetic).  ? A medicine to make you fall asleep (general anesthetic).  ? A medicine that is injected into an area of your body to numb everything below the injection site (regional anesthetic).  · Your surgical site will be marked or identified.  · You may be given an antibiotic through your IV to help prevent infection.  Contact a health care provider if you:  · Develop a fever of more than 100.4°F (38°C) or other feelings of illness during the 48 hours before your surgery.  · Have symptoms that get worse.  Have questions or concerns about your surgery    General Anesthesia/Surgery, Adult  General anesthesia is the use of medicines to make a person \"go to sleep\" (unconscious) for a medical procedure. General anesthesia must be used for certain procedures, and is often recommended for procedures that:  · Last a long time.  · Require you to be still or in an unusual position.  · Are major and can cause blood loss.  The medicines used for general anesthesia are called general anesthetics. As well as making you unconscious for a certain amount of time, these medicines:  · Prevent pain.  · Control your blood pressure.  · Relax your muscles.  Tell a health care provider about:  · Any allergies you have.  · All medicines you are taking, including vitamins, herbs, eye drops, creams, and over-the-counter medicines.  · Any problems you or family members have had with anesthetic medicines.  · Types of anesthetics you have had in the past.  · Any blood disorders you have.  · Any surgeries you have had.  · Any medical conditions you have.  · Any recent upper respiratory, chest, or ear infections.  · Any history of:  ? Heart or lung conditions, such as heart failure, sleep apnea, asthma, or chronic obstructive pulmonary disease (COPD).  ?  service.  ? Depression or anxiety.  · Any tobacco or drug use, including marijuana or alcohol use.  · Whether you are " pregnant or may be pregnant.  What are the risks?  Generally, this is a safe procedure. However, problems may occur, including:  · Allergic reaction.  · Lung and heart problems.  · Inhaling food or liquid from the stomach into the lungs (aspiration).  · Nerve injury.  · Air in the bloodstream, which can lead to stroke.  · Extreme agitation or confusion (delirium) when you wake up from the anesthetic.  · Waking up during your procedure and being unable to move. This is rare.  These problems are more likely to develop if you are having a major surgery or if you have an advanced or serious medical condition. You can prevent some of these complications by answering all of your health care provider's questions thoroughly and by following all instructions before your procedure.  General anesthesia can cause side effects, including:  · Nausea or vomiting.  · A sore throat from the breathing tube.  · Hoarseness.  · Wheezing or coughing.  · Shaking chills.  · Tiredness.  · Body aches.  · Anxiety.  · Sleepiness or drowsiness.  · Confusion or agitation.  RISKS AND COMPLICATIONS OF SURGERY  Your health care provider will discuss possible risks and complications with you before surgery. Common risks and complications include:    · Problems due to the use of anesthetics.  · Blood loss and replacement (does not apply to minor surgical procedures).  · Temporary increase in pain due to surgery.  · Uncorrected pain or problems that the surgery was meant to correct.  · Infection.  · New damage.    What happens before the procedure?    Medicines  Ask your health care provider about:  · Changing or stopping your regular medicines. This is especially important if you are taking diabetes medicines or blood thinners.  · Taking medicines such as aspirin and ibuprofen. These medicines can thin your blood. Do not take these medicines unless your health care provider tells you to take them.  · Taking over-the-counter medicines, vitamins,  herbs, and supplements. Do not take these during the week before your procedure unless your health care provider approves them.  General instructions  · Starting 3-6 weeks before the procedure, do not use any products that contain nicotine or tobacco, such as cigarettes and e-cigarettes. If you need help quitting, ask your health care provider.  · If you brush your teeth on the morning of the procedure, make sure to spit out all of the toothpaste.  · Tell your health care provider if you become ill or develop a cold, cough, or fever.  · If instructed by your health care provider, bring your sleep apnea device with you on the day of your surgery (if applicable).  · Ask your health care provider if you will be going home the same day, the following day, or after a longer hospital stay.  ? Plan to have someone take you home from the hospital or clinic.  ? Plan to have a responsible adult care for you for at least 24 hours after you leave the hospital or clinic. This is important.  What happens during the procedure?  · You will be given anesthetics through both of the following:  ? A mask placed over your nose and mouth.  ? An IV in one of your veins.  · You may receive a medicine to help you relax (sedative).  · After you are unconscious, a breathing tube may be inserted down your throat to help you breathe. This will be removed before you wake up.  · An anesthesia specialist will stay with you throughout your procedure. He or she will:  ? Keep you comfortable and safe by continuing to give you medicines and adjusting the amount of medicine that you get.  ? Monitor your blood pressure, pulse, and oxygen levels to make sure that the anesthetics do not cause any problems.  The procedure may vary among health care providers and hospitals.  What happens after the procedure?  · Your blood pressure, temperature, heart rate, breathing rate, and blood oxygen level will be monitored until the medicines you were given have worn  off.  · You will wake up in a recovery area. You may wake up slowly.  · If you feel anxious or agitated, you may be given medicine to help you calm down.  · If you will be going home the same day, your health care provider may check to make sure you can walk, drink, and urinate.  · Your health care provider will treat any pain or side effects you have before you go home.  · Do not drive for 24 hours if you were given a sedative.  Summary  · General anesthesia is used to keep you still and prevent pain during a procedure.  · It is important to tell your healthcare provider about your medical history and any surgeries you have had, and previous experience with anesthesia.  · Follow your healthcare provider’s instructions about when to stop eating, drinking, or taking certain medicines before your procedure.  · Plan to have someone take you home from the hospital or clinic.  This information is not intended to replace advice given to you by your health care provider. Make sure you discuss any questions you have with your health care provider.  Document Released: 03/26/2009 Document Revised: 08/03/2018 Document Reviewed: 08/03/2018  [employer] Interactive Patient Education © 2019 [employer]       Fall Prevention in Hospitals, Adult  As a hospital patient, your condition and the treatments you receive can increase your risk for falls. Some additional risk factors for falls in a hospital include:  · Being in an unfamiliar environment.  · Being on bed rest.  · Your surgery.  · Taking certain medicines.  · Your tubing requirements, such as intravenous (IV) therapy or catheters.  It is important that you learn how to decrease fall risks while at the hospital. Below are important tips that can help prevent falls.  SAFETY TIPS FOR PREVENTING FALLS  Talk about your risk of falling.  · Ask your health care provider why you are at risk for falling. Is it your medicine, illness, tubing placement, or something else?  · Make a plan  with your health care provider to keep you safe from falls.  · Ask your health care provider or pharmacist about side effects of your medicines. Some medicines can make you dizzy or affect your coordination.  Ask for help.  · Ask for help before getting out of bed. You may need to press your call button.  · Ask for assistance in getting safely to the toilet.  · Ask for a walker or cane to be put at your bedside. Ask that most of the side rails on your bed be placed up before your health care provider leaves the room.  · Ask family or friends to sit with you.  · Ask for things that are out of your reach, such as your glasses, hearing aids, telephone, bedside table, or call button.  Follow these tips to avoid falling:  · Stay lying or seated, rather than standing, while waiting for help.  · Wear rubber-soled slippers or shoes whenever you walk in the hospital.  · Avoid quick, sudden movements.  ¨ Change positions slowly.  ¨ Sit on the side of your bed before standing.  ¨ Stand up slowly and wait before you start to walk.  · Let your health care provider know if there is a spill on the floor.  · Pay careful attention to the medical equipment, electrical cords, and tubes around you.  · When you need help, use your call button by your bed or in the bathroom. Wait for one of your health care providers to help you.  · If you feel dizzy or unsure of your footing, return to bed and wait for assistance.  · Avoid being distracted by the TV, telephone, or another person in your room.  · Do not lean or support yourself on rolling objects, such as IV poles or bedside tables.     This information is not intended to replace advice given to you by your health care provider. Make sure you discuss any questions you have with your health care provider.     Document Released: 12/15/2001 Document Revised: 01/08/2016 Document Reviewed: 08/25/2013  [employer] Interactive Patient Education ©2016 [employer]       [facility]  4% Patient Instruction Sheet    Chlorhexidine Before Surgery  Chlorhexidine gluconate (CHG) is a germ-killing (antiseptic) solution that is used to clean the skin. It gets rid of the bacteria that normally live on the skin. Cleaning your skin with CHG before surgery helps lower the risk for infection after surgery.    How to use CHG solution  · You will take 2 showers, one shower the night before surgery, the second shower the morning of surgery before coming to the hospital.  · Use CHG only as told by your health care provider, and follow the instructions on the label.  · Use CHG solution while taking a shower. Follow these steps when using CHG solution (unless your health care provider gives you different instructions):  1. Start the shower.  2. Use your normal soap and shampoo to wash your face and hair.  3. Turn off the shower or move out of the shower stream.  4. Pour the CHG onto a clean washcloth. Do not use any type of brush or rough-edged sponge.  5. Starting at your neck, lather your body down to your toes. Make sure you:  6. Pay special attention to the part of your body where you will be having surgery. Scrub this area for at least 1 minute.  7. Use the full amount of CHG as directed. Usually, this is one half bottle for each shower.  8. Do not use CHG on your head or face. If the solution gets into your ears or eyes, rinse them well with water.  9. Avoid your genital area.  10. Avoid any areas of skin that have broken skin, cuts, or scrapes.  11. Scrub your back and under your arms. Make sure to wash skin folds.  12. Let the lather sit on your skin for 1-2 minutes or as long as told by your health care  provider.  13. Thoroughly rinse your entire body in the shower. Make sure that all body creases and crevices are rinsed well.  14. Dry off with a clean towel. Do not put any substances on your body afterward, such as powder, lotion, or perfume.  15. Put on clean clothes or pajamas.  16. If it is the  night before your surgery, sleep in clean sheets.    What are the risks?  Risks of using CHG include:  · A skin reaction.  · Hearing loss, if CHG gets in your ears.  · Eye injury, if CHG gets in your eyes and is not rinsed out.  · The CHG product catching fire.  Make sure that you avoid smoking and flames after applying CHG to your skin.  Do not use CHG:  · If you have a chlorhexidine allergy or have previously reacted to chlorhexidine.  · On babies younger than 2 months of age.      On the day of surgery, when you are taken to your room in Outpatient Surgery you will be given a CHG prepackaged cloth to wipe the site for your surgery.  How to use CHG prepackaged cloths  · Follow the instructions on the label.  · Use the CHG cloth on clean, dry skin. Follow these steps when using a CHG cloth (unless your health care provider gives you different instructions):  1. Using the CHG cloth, vigorously scrub the part of your body where you will be having surgery. Scrub using a back-and-forth motion for 3 minutes. The area on your body should be completely wet with CHG when you are finished scrubbing.  2. Do not rinse. Discard the cloth and let the area air-dry for 1 minute. Do not put any substances on your body afterward, such as powder, lotion, or perfume.  Contact a health care provider if:  · Your skin gets irritated after scrubbing.  · You have questions about using your solution or cloth.  Get help right away if:  · Your eyes become very red or swollen.  · Your eyes itch badly.  · Your skin itches badly and is red or swollen.  · Your hearing changes.  · You have trouble seeing.  · You have swelling or tingling in your mouth or throat.  · You have trouble breathing.  · You swallow any chlorhexidine.  Summary  · Chlorhexidine gluconate (CHG) is a germ-killing (antiseptic) solution that is used to clean the skin. Cleaning your skin with CHG before surgery helps lower the risk for infection after surgery.  · You may be  given CHG to use at home. It may be in a bottle or in a prepackaged cloth to use on your skin. Carefully follow your health care provider's instructions and the instructions on the product label.  · Do not use CHG if you have a chlorhexidine allergy.  · Contact your health care provider if your skin gets irritated after scrubbing.  This information is not intended to replace advice given to you by your health care provider. Make sure you discuss any questions you have with your health care provider.  Document Released: 09/11/2013 Document Revised: 11/15/2018 Document Reviewed: 11/15/2018  [employer] Interactive Patient Education © 2019 [employer]          PATIENT/FAMILY/RESPONSIBLE PARTY VERBALIZES UNDERSTANDING OF ABOVE EDUCATION.  COPY OF PAIN SCALE GIVEN AND REVIEWED WITH VERBALIZED UNDERSTANDING.

## 2021-06-10 ENCOUNTER — ANESTHESIA (OUTPATIENT)
Dept: PERIOP | Facility: HOSPITAL | Age: 56
End: 2021-06-10

## 2021-06-10 ENCOUNTER — DOCUMENTATION (OUTPATIENT)
Dept: SOCIAL WORK | Facility: HOSPITAL | Age: 56
End: 2021-06-10

## 2021-06-10 ENCOUNTER — HOSPITAL ENCOUNTER (OUTPATIENT)
Facility: HOSPITAL | Age: 56
Setting detail: HOSPITAL OUTPATIENT SURGERY
Discharge: HOME | End: 2021-06-10
Attending: STUDENT IN AN ORGANIZED HEALTH CARE EDUCATION/TRAINING PROGRAM | Admitting: STUDENT IN AN ORGANIZED HEALTH CARE EDUCATION/TRAINING PROGRAM

## 2021-06-10 VITALS
RESPIRATION RATE: 19 BRPM | TEMPERATURE: 97.8 F | SYSTOLIC BLOOD PRESSURE: 153 MMHG | DIASTOLIC BLOOD PRESSURE: 98 MMHG | OXYGEN SATURATION: 92 % | HEART RATE: 70 BPM

## 2021-06-10 DIAGNOSIS — K40.90 RIGHT INGUINAL HERNIA: Primary | ICD-10-CM

## 2021-06-10 PROBLEM — K42.9 UMBILICAL HERNIA: Status: ACTIVE | Noted: 2021-06-10

## 2021-06-10 LAB
CCP IGA+IGG SERPL IA-ACNC: 8 UNITS (ref 0–19)
CHROMATIN AB SERPL-ACNC: 19.2 IU/ML (ref 0–14)

## 2021-06-10 PROCEDURE — 25010000002 HYDROMORPHONE PER 4 MG: Performed by: NURSE ANESTHETIST, CERTIFIED REGISTERED

## 2021-06-10 PROCEDURE — 25010000002 HEPARIN (PORCINE) PER 1000 UNITS: Performed by: STUDENT IN AN ORGANIZED HEALTH CARE EDUCATION/TRAINING PROGRAM

## 2021-06-10 PROCEDURE — 25010000002 MORPHINE SULFATE (PF) 2 MG/ML SOLUTION 1 ML CARTRIDGE: Performed by: STUDENT IN AN ORGANIZED HEALTH CARE EDUCATION/TRAINING PROGRAM

## 2021-06-10 PROCEDURE — C1781 MESH (IMPLANTABLE): HCPCS | Performed by: STUDENT IN AN ORGANIZED HEALTH CARE EDUCATION/TRAINING PROGRAM

## 2021-06-10 PROCEDURE — 25010000002 FENTANYL CITRATE (PF) 50 MCG/ML SOLUTION: Performed by: NURSE ANESTHETIST, CERTIFIED REGISTERED

## 2021-06-10 PROCEDURE — 25010000002 ONDANSETRON PER 1 MG: Performed by: NURSE ANESTHETIST, CERTIFIED REGISTERED

## 2021-06-10 PROCEDURE — C1889 IMPLANT/INSERT DEVICE, NOC: HCPCS | Performed by: STUDENT IN AN ORGANIZED HEALTH CARE EDUCATION/TRAINING PROGRAM

## 2021-06-10 PROCEDURE — 25010000002 DEXAMETHASONE PER 1 MG: Performed by: STUDENT IN AN ORGANIZED HEALTH CARE EDUCATION/TRAINING PROGRAM

## 2021-06-10 PROCEDURE — 25010000002 FENTANYL CITRATE (PF) 250 MCG/5ML SOLUTION: Performed by: NURSE ANESTHETIST, CERTIFIED REGISTERED

## 2021-06-10 PROCEDURE — 25010000002 CEFAZOLIN PER 500 MG: Performed by: STUDENT IN AN ORGANIZED HEALTH CARE EDUCATION/TRAINING PROGRAM

## 2021-06-10 PROCEDURE — 25010000002 PROPOFOL 10 MG/ML EMULSION: Performed by: NURSE ANESTHETIST, CERTIFIED REGISTERED

## 2021-06-10 DEVICE — ABSORBABLE WOUND CLOSURE DEVICE
Type: IMPLANTABLE DEVICE | Site: ABDOMEN | Status: FUNCTIONAL
Brand: V-LOC 90

## 2021-06-10 DEVICE — DEV WND/CLS CONTRL TISS STRATAFIX SYMM PDS PLS SZ0 45CM VIL: Type: IMPLANTABLE DEVICE | Site: ABDOMEN | Status: FUNCTIONAL

## 2021-06-10 DEVICE — BARD SOFT MESH
Type: IMPLANTABLE DEVICE | Site: ABDOMEN | Status: FUNCTIONAL
Brand: BARD SOFT MESH

## 2021-06-10 RX ORDER — SODIUM CHLORIDE, SODIUM LACTATE, POTASSIUM CHLORIDE, CALCIUM CHLORIDE 600; 310; 30; 20 MG/100ML; MG/100ML; MG/100ML; MG/100ML
1000 INJECTION, SOLUTION INTRAVENOUS CONTINUOUS
Status: DISCONTINUED | OUTPATIENT
Start: 2021-06-10 | End: 2021-06-10 | Stop reason: HOSPADM

## 2021-06-10 RX ORDER — NICARDIPINE HYDROCHLORIDE 2.5 MG/ML
INJECTION INTRAVENOUS AS NEEDED
Status: DISCONTINUED | OUTPATIENT
Start: 2021-06-10 | End: 2021-06-10 | Stop reason: SURG

## 2021-06-10 RX ORDER — IBUPROFEN 200 MG
600 TABLET ORAL EVERY 8 HOURS
Qty: 100 TABLET | Refills: 2
Start: 2021-06-10 | End: 2022-06-10

## 2021-06-10 RX ORDER — MIDAZOLAM HYDROCHLORIDE 1 MG/ML
1 INJECTION INTRAMUSCULAR; INTRAVENOUS
Status: DISCONTINUED | OUTPATIENT
Start: 2021-06-10 | End: 2021-06-10 | Stop reason: HOSPADM

## 2021-06-10 RX ORDER — NALOXONE HCL 0.4 MG/ML
0.04 VIAL (ML) INJECTION AS NEEDED
Status: DISCONTINUED | OUTPATIENT
Start: 2021-06-10 | End: 2021-06-10 | Stop reason: HOSPADM

## 2021-06-10 RX ORDER — IBUPROFEN 600 MG/1
600 TABLET ORAL ONCE AS NEEDED
Status: DISCONTINUED | OUTPATIENT
Start: 2021-06-10 | End: 2021-06-10 | Stop reason: HOSPADM

## 2021-06-10 RX ORDER — LIDOCAINE HYDROCHLORIDE 10 MG/ML
0.5 INJECTION, SOLUTION EPIDURAL; INFILTRATION; INTRACAUDAL; PERINEURAL ONCE AS NEEDED
Status: DISCONTINUED | OUTPATIENT
Start: 2021-06-10 | End: 2021-06-10 | Stop reason: HOSPADM

## 2021-06-10 RX ORDER — OXYCODONE HYDROCHLORIDE 5 MG/1
5 TABLET ORAL EVERY 6 HOURS PRN
Qty: 15 TABLET | Refills: 0 | Status: SHIPPED | OUTPATIENT
Start: 2021-06-10 | End: 2022-06-10

## 2021-06-10 RX ORDER — FLUMAZENIL 0.1 MG/ML
0.2 INJECTION INTRAVENOUS AS NEEDED
Status: DISCONTINUED | OUTPATIENT
Start: 2021-06-10 | End: 2021-06-10 | Stop reason: HOSPADM

## 2021-06-10 RX ORDER — SODIUM CHLORIDE 0.9 % (FLUSH) 0.9 %
3 SYRINGE (ML) INJECTION AS NEEDED
Status: DISCONTINUED | OUTPATIENT
Start: 2021-06-10 | End: 2021-06-10 | Stop reason: HOSPADM

## 2021-06-10 RX ORDER — BUPIVACAINE HYDROCHLORIDE 5 MG/ML
INJECTION, SOLUTION PERINEURAL AS NEEDED
Status: DISCONTINUED | OUTPATIENT
Start: 2021-06-10 | End: 2021-06-10 | Stop reason: HOSPADM

## 2021-06-10 RX ORDER — FENTANYL CITRATE 50 UG/ML
INJECTION, SOLUTION INTRAMUSCULAR; INTRAVENOUS AS NEEDED
Status: DISCONTINUED | OUTPATIENT
Start: 2021-06-10 | End: 2021-06-10 | Stop reason: SURG

## 2021-06-10 RX ORDER — SODIUM CHLORIDE 9 MG/ML
INJECTION, SOLUTION INTRAVENOUS AS NEEDED
Status: DISCONTINUED | OUTPATIENT
Start: 2021-06-10 | End: 2021-06-10 | Stop reason: HOSPADM

## 2021-06-10 RX ORDER — HYDROMORPHONE HYDROCHLORIDE 1 MG/ML
0.5 INJECTION, SOLUTION INTRAMUSCULAR; INTRAVENOUS; SUBCUTANEOUS
Status: DISCONTINUED | OUTPATIENT
Start: 2021-06-10 | End: 2021-06-10 | Stop reason: HOSPADM

## 2021-06-10 RX ORDER — ACETAMINOPHEN 325 MG/1
975 TABLET ORAL EVERY 8 HOURS
Qty: 100 TABLET | Refills: 2
Start: 2021-06-10 | End: 2022-06-10

## 2021-06-10 RX ORDER — PROPOFOL 10 MG/ML
VIAL (ML) INTRAVENOUS AS NEEDED
Status: DISCONTINUED | OUTPATIENT
Start: 2021-06-10 | End: 2021-06-10 | Stop reason: SURG

## 2021-06-10 RX ORDER — NEOSTIGMINE METHYLSULFATE 5 MG/5 ML
SYRINGE (ML) INTRAVENOUS AS NEEDED
Status: DISCONTINUED | OUTPATIENT
Start: 2021-06-10 | End: 2021-06-10 | Stop reason: SURG

## 2021-06-10 RX ORDER — FENTANYL CITRATE 50 UG/ML
25 INJECTION, SOLUTION INTRAMUSCULAR; INTRAVENOUS
Status: DISCONTINUED | OUTPATIENT
Start: 2021-06-10 | End: 2021-06-10 | Stop reason: HOSPADM

## 2021-06-10 RX ORDER — SODIUM CHLORIDE 0.9 % (FLUSH) 0.9 %
10 SYRINGE (ML) INJECTION AS NEEDED
Status: DISCONTINUED | OUTPATIENT
Start: 2021-06-10 | End: 2021-06-10 | Stop reason: HOSPADM

## 2021-06-10 RX ORDER — SODIUM CHLORIDE, SODIUM LACTATE, POTASSIUM CHLORIDE, CALCIUM CHLORIDE 600; 310; 30; 20 MG/100ML; MG/100ML; MG/100ML; MG/100ML
100 INJECTION, SOLUTION INTRAVENOUS CONTINUOUS
Status: DISCONTINUED | OUTPATIENT
Start: 2021-06-10 | End: 2021-06-10 | Stop reason: HOSPADM

## 2021-06-10 RX ORDER — HEPARIN SODIUM 5000 [USP'U]/ML
5000 INJECTION, SOLUTION INTRAVENOUS; SUBCUTANEOUS ONCE
Status: COMPLETED | OUTPATIENT
Start: 2021-06-10 | End: 2021-06-10

## 2021-06-10 RX ORDER — SODIUM CHLORIDE 0.9 % (FLUSH) 0.9 %
3-10 SYRINGE (ML) INJECTION AS NEEDED
Status: DISCONTINUED | OUTPATIENT
Start: 2021-06-10 | End: 2021-06-10 | Stop reason: HOSPADM

## 2021-06-10 RX ORDER — OXYCODONE HYDROCHLORIDE AND ACETAMINOPHEN 5; 325 MG/1; MG/1
1 TABLET ORAL EVERY 4 HOURS PRN
Status: DISCONTINUED | OUTPATIENT
Start: 2021-06-10 | End: 2021-06-10 | Stop reason: HOSPADM

## 2021-06-10 RX ORDER — ONDANSETRON 2 MG/ML
INJECTION INTRAMUSCULAR; INTRAVENOUS AS NEEDED
Status: DISCONTINUED | OUTPATIENT
Start: 2021-06-10 | End: 2021-06-10 | Stop reason: SURG

## 2021-06-10 RX ORDER — ONDANSETRON 4 MG/1
4 TABLET, FILM COATED ORAL EVERY 8 HOURS PRN
Qty: 15 TABLET | Refills: 0 | Status: SHIPPED | OUTPATIENT
Start: 2021-06-10 | End: 2022-06-10

## 2021-06-10 RX ORDER — ACETAMINOPHEN 500 MG
1000 TABLET ORAL ONCE
Status: DISCONTINUED | OUTPATIENT
Start: 2021-06-10 | End: 2021-06-10 | Stop reason: HOSPADM

## 2021-06-10 RX ORDER — LIDOCAINE HYDROCHLORIDE 20 MG/ML
INJECTION, SOLUTION EPIDURAL; INFILTRATION; INTRACAUDAL; PERINEURAL AS NEEDED
Status: DISCONTINUED | OUTPATIENT
Start: 2021-06-10 | End: 2021-06-10 | Stop reason: SURG

## 2021-06-10 RX ORDER — BUPIVACAINE HCL/0.9 % NACL/PF 0.1 %
2 PLASTIC BAG, INJECTION (ML) EPIDURAL ONCE
Status: COMPLETED | OUTPATIENT
Start: 2021-06-10 | End: 2021-06-10

## 2021-06-10 RX ORDER — ONDANSETRON 2 MG/ML
4 INJECTION INTRAMUSCULAR; INTRAVENOUS AS NEEDED
Status: DISCONTINUED | OUTPATIENT
Start: 2021-06-10 | End: 2021-06-10 | Stop reason: HOSPADM

## 2021-06-10 RX ORDER — SODIUM CHLORIDE 0.9 % (FLUSH) 0.9 %
3 SYRINGE (ML) INJECTION EVERY 12 HOURS SCHEDULED
Status: DISCONTINUED | OUTPATIENT
Start: 2021-06-10 | End: 2021-06-10 | Stop reason: HOSPADM

## 2021-06-10 RX ORDER — ROCURONIUM BROMIDE 10 MG/ML
INJECTION, SOLUTION INTRAVENOUS AS NEEDED
Status: DISCONTINUED | OUTPATIENT
Start: 2021-06-10 | End: 2021-06-10 | Stop reason: SURG

## 2021-06-10 RX ORDER — LABETALOL HYDROCHLORIDE 5 MG/ML
5 INJECTION, SOLUTION INTRAVENOUS
Status: DISCONTINUED | OUTPATIENT
Start: 2021-06-10 | End: 2021-06-10 | Stop reason: HOSPADM

## 2021-06-10 RX ORDER — LIDOCAINE HYDROCHLORIDE 40 MG/ML
SOLUTION TOPICAL AS NEEDED
Status: DISCONTINUED | OUTPATIENT
Start: 2021-06-10 | End: 2021-06-10 | Stop reason: SURG

## 2021-06-10 RX ADMIN — SODIUM CHLORIDE, POTASSIUM CHLORIDE, SODIUM LACTATE AND CALCIUM CHLORIDE 1000 ML: 600; 310; 30; 20 INJECTION, SOLUTION INTRAVENOUS at 06:37

## 2021-06-10 RX ADMIN — FENTANYL CITRATE 25 MCG: 50 INJECTION, SOLUTION INTRAMUSCULAR; INTRAVENOUS at 09:08

## 2021-06-10 RX ADMIN — HEPARIN SODIUM 5000 UNITS: 5000 INJECTION, SOLUTION INTRAVENOUS; SUBCUTANEOUS at 07:00

## 2021-06-10 RX ADMIN — Medication 2 G: at 07:15

## 2021-06-10 RX ADMIN — PROPOFOL 200 MG: 10 INJECTION, EMULSION INTRAVENOUS at 07:09

## 2021-06-10 RX ADMIN — ONDANSETRON 4 MG: 2 INJECTION INTRAMUSCULAR; INTRAVENOUS at 08:30

## 2021-06-10 RX ADMIN — LIDOCAINE HYDROCHLORIDE 100 MG: 20 INJECTION, SOLUTION EPIDURAL; INFILTRATION; INTRACAUDAL; PERINEURAL at 07:09

## 2021-06-10 RX ADMIN — FENTANYL CITRATE 100 MCG: 50 INJECTION, SOLUTION INTRAMUSCULAR; INTRAVENOUS at 07:05

## 2021-06-10 RX ADMIN — HYDROMORPHONE HYDROCHLORIDE 0.5 MG: 1 INJECTION, SOLUTION INTRAMUSCULAR; INTRAVENOUS; SUBCUTANEOUS at 09:06

## 2021-06-10 RX ADMIN — Medication 3 MG: at 08:30

## 2021-06-10 RX ADMIN — FENTANYL CITRATE 100 MCG: 50 INJECTION, SOLUTION INTRAMUSCULAR; INTRAVENOUS at 07:15

## 2021-06-10 RX ADMIN — LIDOCAINE HYDROCHLORIDE 1 EACH: 40 SOLUTION TOPICAL at 07:10

## 2021-06-10 RX ADMIN — NICARDIPINE HYDROCHLORIDE 500 MCG: 25 INJECTION INTRAVENOUS at 08:08

## 2021-06-10 RX ADMIN — OXYCODONE HYDROCHLORIDE AND ACETAMINOPHEN 1 TABLET: 5; 325 TABLET ORAL at 09:35

## 2021-06-10 RX ADMIN — FENTANYL CITRATE 50 MCG: 50 INJECTION, SOLUTION INTRAMUSCULAR; INTRAVENOUS at 07:56

## 2021-06-10 RX ADMIN — SODIUM CHLORIDE, POTASSIUM CHLORIDE, SODIUM LACTATE AND CALCIUM CHLORIDE 1000 ML: 600; 310; 30; 20 INJECTION, SOLUTION INTRAVENOUS at 06:38

## 2021-06-10 RX ADMIN — GLYCOPYRROLATE 0.4 MG: 0.2 INJECTION, SOLUTION INTRAMUSCULAR; INTRAVENOUS at 08:30

## 2021-06-10 RX ADMIN — FENTANYL CITRATE 25 MCG: 50 INJECTION, SOLUTION INTRAMUSCULAR; INTRAVENOUS at 09:13

## 2021-06-10 RX ADMIN — ROCURONIUM BROMIDE 50 MG: 10 INJECTION INTRAVENOUS at 07:09

## 2021-06-10 NOTE — ANESTHESIA PREPROCEDURE EVALUATION
Anesthesia Evaluation     Patient summary reviewed and Nursing notes reviewed   no history of anesthetic complications:  NPO Solid Status: > 8 hours  NPO Liquid Status: > 8 hours           Airway   Mallampati: II  No difficulty expected  Dental - normal exam         Pulmonary    (+) a smoker Current, COPD,   Cardiovascular   Exercise tolerance: good (4-7 METS)    (+) hypertension poorly controlled,   (-) dysrhythmias, angina      Neuro/Psych  (+) psychiatric history Schizophrenia and Bipolar,     (-) seizures, TIA, CVA    ROS Comment: [place] palsy  GI/Hepatic/Renal/Endo    (+)   hepatitis C, liver disease,   (-) no renal disease, diabetes    Musculoskeletal (-) negative ROS    Abdominal    Substance History - negative use     OB/GYN negative ob/gyn ROS         Other                      Anesthesia Plan    ASA 3     general     intravenous induction     Anesthetic plan, all risks, benefits, and alternatives have been provided, discussed and informed consent has been obtained with: patient.

## 2021-06-10 NOTE — INTERVAL H&P NOTE
H&P reviewed. The patient was examined and there are no changes to the H&P.    Patient cleared by Dr. Cuadra. I have marked his umbilical hernia site. Plan for robotic right inguinal hernia repair with periumbilical hernia repair simultaneously.     Molly Jean MD  06/10/21

## 2021-06-10 NOTE — PROGRESS NOTES
Pt needing a ride home and has a rider with him. Pt will be provided ride home via Patient Assistance Fund, total $9.00.

## 2021-06-10 NOTE — DISCHARGE INSTRUCTIONS
Wound:   - you have skin glue on your incisions. Okay to shower tonight.   - Leave skin glue in place, it should slowly fall off over 2 weeks   - No swimming/soaking/bathing x 2 weeks to allow incisions to heal.     Activity:   - Activity as tolerated. NO heavy lifting x 4 weeks - no more than 35lb at a time.   - No driving or operating machinery on narcotic pain medication.     Pain medication:   - Take 1000mg of tylenol every 8 hours for 3 days. After three days, take it prn.   - Take 600mg of ibuprofen (motrin) every 8 hours for 3 days. After three days, take it prn.   - You have a prescription for a narcotic. It will be roxicodone 5mg tabs. Take these only as needed after you have taken the tylenol and ibuprofen. If you are taking the roxicodone, make sure to take a stool softener (colace) with it as it can cause constipation.   - The narcotic may make you nauseated, you will have a prescription for zofran in case of nausea.     Follow up:   - make an appointment to see me in 2 weeks  - If you have any concerns before then, call me office at [phone]       YOUR NEXT PAIN MEDICATION IS DUE AT______________         General Anesthesia, Adult, Care After  This sheet gives you information about how to care for yourself after your procedure. Your health care provider may also give you more specific instructions. If you have problems or questions, contact your health care provider.  What can I expect after the procedure?  After the procedure, the following side effects are common:  · Pain or discomfort at the IV site.  · Nausea.  · Vomiting.  · Sore throat.  · Trouble concentrating.  · Feeling cold or chills.  · Weak or tired.  · Sleepiness and fatigue.  · Soreness and body aches. These side effects can affect parts of the body that were not involved in surgery.  Follow these instructions at home:   For at least 24 hours after the procedure:  1. Have a responsible adult stay with you. It is important to have  someone help care for you until you are awake and alert.  2. Rest as needed.  3. Do not:  ? Participate in activities in which you could fall or become injured.  ? Drive.  ? Use heavy machinery.  ? Drink alcohol.  ? Take sleeping pills or medicines that cause drowsiness.  ? Make important decisions or sign legal documents.  ? Take care of children on your own.  Eating and drinking  · Follow any instructions from your health care provider about eating or drinking restrictions.  · When you feel hungry, start by eating small amounts of foods that are soft and easy to digest (bland), such as toast. Gradually return to your regular diet.  · Drink enough fluid to keep your urine pale yellow.  · If you vomit, rehydrate by drinking water, juice, or clear broth.  General instructions  1. If you have sleep apnea, surgery and certain medicines can increase your risk for breathing problems. Follow instructions from your health care provider about wearing your sleep device:  ? Anytime you are sleeping, including during daytime naps.  ? While taking prescription pain medicines, sleeping medicines, or medicines that make you drowsy.  2. Return to your normal activities as told by your health care provider. Ask your health care provider what activities are safe for you.  3. Take over-the-counter and prescription medicines only as told by your health care provider.  4. If you smoke, do not smoke without supervision.  5. Keep all follow-up visits as told by your health care provider. This is important.  Contact a health care provider if:  · You have nausea or vomiting that does not get better with medicine.  · You cannot eat or drink without vomiting.  · You have pain that does not get better with medicine.  · You are unable to pass urine.  · You develop a skin rash.  · You have a fever.  · You have redness around your IV site that gets worse.  Get help right away if:  · You have difficulty breathing.  · You have chest pain.  · You  have blood in your urine or stool, or you vomit blood.  Summary  · After the procedure, it is common to have a sore throat or nausea. It is also common to feel tired.  · Have a responsible adult stay with you for the first 24 hours after general anesthesia. It is important to have someone help care for you until you are awake and alert.  · When you feel hungry, start by eating small amounts of foods that are soft and easy to digest (bland), such as toast. Gradually return to your regular diet.  · Drink enough fluid to keep your urine pale yellow.  · Return to your normal activities as told by your health care provider. Ask your health care provider what activities are safe for you.  This information is not intended to replace advice given to you by your health care provider. Make sure you discuss any questions you have with your health care provider.  Document Revised: 12/21/2018 Document Reviewed: 08/03/2018    CALL YOUR PHYSICIAN IF YOU EXPERIENCE  INCREASED PAIN NOT HELPED BY YOUR PAIN MEDICATION.      .                                              Fall Prevention in the Home      Falls can cause injuries. They can happen to people of all ages. There are many things you can do to make your home safe and to help prevent falls.    WHAT CAN I DO ON THE OUTSIDE OF MY HOME?  · Regularly fix the edges of walkways and driveways and fix any cracks.  · Remove anything that might make you trip as you walk through a door, such as a raised step or threshold.  · Trim any bushes or trees on the path to your home.  · Use bright outdoor lighting.  · Clear any walking paths of anything that might make someone trip, such as rocks or tools.  · Regularly check to see if handrails are loose or broken. Make sure that both sides of any steps have handrails.  · Any raised decks and porches should have guardrails on the edges.  · Have any leaves, snow, or ice cleared regularly.  · Use sand or salt on walking paths during winter.  · Clean  up any spills in your garage right away. This includes oil or grease spills.  WHAT CAN I DO IN THE BATHROOM?    · Use night lights.  · Install grab bars by the toilet and in the tub and shower. Do not use towel bars as grab bars.  · Use non-skid mats or decals in the tub or shower.  · If you need to sit down in the shower, use a plastic, non-slip stool.  · Keep the floor dry. Clean up any water that spills on the floor as soon as it happens.  · Remove soap buildup in the tub or shower regularly.  · Attach bath mats securely with double-sided non-slip rug tape.  · Do not have throw rugs and other things on the floor that can make you trip.  WHAT CAN I DO IN THE BEDROOM?  · Use night lights.  · Make sure that you have a light by your bed that is easy to reach.  · Do not use any sheets or blankets that are too big for your bed. They should not hang down onto the floor.  · Have a firm chair that has side arms. You can use this for support while you get dressed.  · Do not have throw rugs and other things on the floor that can make you trip.  WHAT CAN I DO IN THE KITCHEN?  · Clean up any spills right away.  · Avoid walking on wet floors.  · Keep items that you use a lot in easy-to-reach places.  · If you need to reach something above you, use a strong step stool that has a grab bar.  · Keep electrical cords out of the way.  · Do not use floor polish or wax that makes floors slippery. If you must use wax, use non-skid floor wax.  · Do not have throw rugs and other things on the floor that can make you trip.  WHAT CAN I DO WITH MY STAIRS?  · Do not leave any items on the stairs.  · Make sure that there are handrails on both sides of the stairs and use them. Fix handrails that are broken or loose. Make sure that handrails are as long as the stairways.  · Check any carpeting to make sure that it is firmly attached to the stairs. Fix any carpet that is loose or worn.  · Avoid having throw rugs at the top or bottom of the  stairs. If you do have throw rugs, attach them to the floor with carpet tape.  · Make sure that you have a light switch at the top of the stairs and the bottom of the stairs. If you do not have them, ask someone to add them for you.  WHAT ELSE CAN I DO TO HELP PREVENT FALLS?  · Wear shoes that:  ¨ Do not have high heels.  ¨ Have rubber bottoms.  ¨ Are comfortable and fit you well.  ¨ Are closed at the toe. Do not wear sandals.  · If you use a stepladder:  ¨ Make sure that it is fully opened. Do not climb a closed stepladder.  ¨ Make sure that both sides of the stepladder are locked into place.  ¨ Ask someone to hold it for you, if possible.  · Clearly fausto and make sure that you can see:  ¨ Any grab bars or handrails.  ¨ First and last steps.  ¨ Where the edge of each step is.  · Use tools that help you move around (mobility aids) if they are needed. These include:  ¨ Canes.  ¨ Walkers.  ¨ Scooters.  ¨ Crutches.  · Turn on the lights when you go into a dark area. Replace any light bulbs as soon as they burn out.  · Set up your furniture so you have a clear path. Avoid moving your furniture around.  · If any of your floors are uneven, fix them.  · If there are any pets around you, be aware of where they are.  · Review your medicines with your doctor. Some medicines can make you feel dizzy. This can increase your chance of falling.  Ask your doctor what other things that you can do to help prevent falls.     This information is not intended to replace advice given to you by your health care provider. Make sure you discuss any questions you have with your health care provider.     Document Released: 10/14/2010 Document Revised: 05/03/2016 Document Reviewed: 01/22/2016  Elsevier Interactive Patient Education ©2016 [employer]     PATIENT/FAMILY/RESPONSIBLE PARTY VERBALIZES UNDERSTANDING OF ABOVE EDUCATION.  COPY OF PAIN SCALE GIVEN AND REVIEWED WITH VERBALIZED UNDERSTANDING.

## 2021-06-10 NOTE — BRIEF OP NOTE
UMBILICAL HERNIA REPAIR LAPAROSCOPIC WITH DAVINCI ROBOT, INGUINAL HERNIA REPAIR LAPAROSCOPIC WITH DAVINCI ROBOT  Progress Note    Arsen Stephane  6/10/2021    Pre-op Diagnosis:   HERNIA (x2)       Post-Op Diagnosis Codes:   Right Inguinal Hernia   Umbilical Hernia     Procedure/CPT® Codes:        Procedure(s):  ROBOTIC UMBILICAL HERNIA REPAIR WITH POSSIBLE MESH, ROBOTIC RIGHT INGUINAL HERNIA REPAIR WITH POSSIBLE MESH  ROBOTIC RIGHT INGUINAL HERNIA REPAIR WITH POSSIBLE MESH    Surgeon(s):  Molly Jean MD    Anesthesia: General    Staff:   Circulator: Jurgen Landry RN  Scrub Person: Carly Bradley; Luca Sandoval; Adrianna Holloway         Estimated Blood Loss: 20mL    Urine Voided: * No values recorded between 6/10/2021  7:02 AM and 6/10/2021  8:36 AM *    Specimens:                None          Drains: * No LDAs found *    Findings: Right indirect inguinal hernia, small 1cm umbilical hernia     Complications: none apparent           Molly Jean MD     Date: 6/10/2021  Time: 08:36 CDT

## 2021-06-10 NOTE — ANESTHESIA POSTPROCEDURE EVALUATION
Patient: Arsen Calderón    Procedure Summary     Date: 06/10/21 Room / Location:  [facility] OR  /  [facility] OR    Anesthesia Start: 0702 Anesthesia Stop: 0844    Procedures:       ROBOTIC UMBILICAL HERNIA REPAIR WITH POSSIBLE MESH, ROBOTIC RIGHT INGUINAL HERNIA REPAIR WITH POSSIBLE MESH (N/A Abdomen)      ROBOTIC RIGHT INGUINAL HERNIA REPAIR WITH POSSIBLE MESH (Right Abdomen) Diagnosis: (HERNIA (x2))    Surgeons: Molly Jean MD Provider: Cyrus Restrepo CRNA    Anesthesia Type: general ASA Status: 3          Anesthesia Type: general    Vitals  Vitals Value Taken Time   /83 06/10/21 0930   Temp 97.8 °F (36.6 °C) 06/10/21 0930   Pulse 64 06/10/21 0933   Resp 18 06/10/21 0930   SpO2 95 % 06/10/21 0933   Vitals shown include unvalidated device data.        Post Anesthesia Care and Evaluation    Patient location during evaluation: PACU  Patient participation: complete - patient participated  Level of consciousness: awake and alert  Pain management: adequate  Airway patency: patent  Anesthetic complications: No anesthetic complications    Cardiovascular status: acceptable  Respiratory status: acceptable  Hydration status: acceptable    Comments: Blood pressure 150/98, pulse 78, temperature 97.8 °F (36.6 °C), temperature source Temporal, resp. rate 18, SpO2 96 %.    Pt discharged from PACU based on charlee score >8

## 2021-06-10 NOTE — OP NOTE
Laparoscopic Robotic-Assisted Right Inguinal Hernia Repair with Mesh, Open Umbilical Hernia Repair:     Patient: Arsen Calderón  MRN: [id number]    YOB: 1965  Age: 56 y.o.  Sex: male  Unit:  [facility] OR Room/Bed: [facility] OR/MAIN OR Location: [facility]    Admitting Physician: MOLLY JEAN    Primary Care Physician: CLIFTNO Cuadra MD             INDICATIONS: This is a 56 y.o. year old male who presents with a right inguinal hernia and umbilical hernia that were symptomatic. After a discussion of risks and benefits in office, consent was obtained for repair.      DATE OF OPERATION: 6/10/2021     Surgeon(s) and Role:     * Molly Jean MD - Primary    ANESTHESIA: General     PREOPERATIVE DIAGNOSIS:   (1) Right Inguinal Hernia   (2) Umbilical Hernia     POSTOPERATIVE DIAGNOSIS: Same, Indirect right inguinal hernia    PROCEDURES PERFORMED:    (1) Laparoscopic, robotic-assisted right inguinal hernia repair with mesh   (2) Open Umbilical hernia repair     PROCEDURE DETAILS:   After informed consent was obtained, the patient was brought to the operating room. Pre-operative antibiotics and subcutaneous heparin were administered. General anesthesia was induced. The abdomen was prepped with chloraprep and draped in a sterile fashion. A time-out was performed verifying the correct patient, procedure, and side. A 5mm incision was made in the left subcostal region and the veress was inserted. The abdomen was insufflated to 15mmHg and a 5mm trocar was inserted followed by the laparoscope. A TAP block was performed with my deep local. The umbilical hernia was reduced with pressure. An 8mm incision was made just superior to the umbilicus and the 8mm trocar was placed through the already present hernia defect.  Two 8mm trocars were placed on each side laterally approximately 10cm lateral to the umbilical port. He had a large right inguinal hernia.  The bed was flexed. We then docked the robot in the usual  "fashion from the left side. We identified the large right inguinal hernia. The hook was placed in the right arm, and a prograsp was placed in the left arm. The peritoneum was opened high on the abdominal wall.There was a large indirect right inguinal hernia. The peritoneum was taken down and the hernia sac was reduced back into the abdomen.  The cord was identified and preserved. The pubic tubercle was dissected clean as was Addison's ligament on the left side. Once the space was completed dissected out, a 4\" x 6\" Bard soft mesh was placed into the abdomen. Then using a [employer] needle , we positioned the mesh in the inguinal space, covering the direct, indirect, and femoral spaces down to the level of the medial take off of the Vas. It fit very nicely in the space. A 2-0 vicryl suture was placed in the abdomen and it was used to suture the mesh in place - 2 sutures on Addison's ligament, a suture medial to the inferior epigastric vessels, a suture lateral to the inferior epigastric vessels, and one suture to cover the direct space. The peritoneum was closed with a 2-0 V-lock absorbable suture. The needles were removed from the abdomen and needle counts were correct. The robot was undocked, the trocars were removed, and the abdomen was desufflated. The umbilical hernia defect was only approximately 1cm. It was repaired in a primary fashion with interrupted 0-Ethibond sutures. The umbilicus was tacked back down with a 3-0 vicryl suture. The skin of all four incisions was closed with a 4-0 monocryl suture. Skin glue was placed to all incisions. A pressure dressing was placed over the umbilicus. The patient was transported to PACU in stable condition.       Findings: large indirect right inguinal hernia, 1cm umbilical hernia   Estimated Blood Loss: 20mL  Complications: none apparent            Specimens: none      Disposition: PACU - hemodynamically stable.           Condition: stable    Molly Jean " MD  05/20/2021

## 2021-06-10 NOTE — ANESTHESIA PROCEDURE NOTES
Airway  Urgency: elective    Date/Time: 6/10/2021 7:10 AM  Airway not difficult    General Information and Staff    Patient location during procedure: OR  CRNA: Cyrus Restrepo CRNA    Indications and Patient Condition  Indications for airway management: airway protection    Preoxygenated: yes  MILS maintained throughout  Mask difficulty assessment: 1 - vent by mask    Final Airway Details  Final airway type: endotracheal airway      Successful airway: ETT  Cuffed: yes   Successful intubation technique: direct laryngoscopy  Endotracheal tube insertion site: oral  Blade: Corley  Blade size: 2  ETT size (mm): 7.5  Cormack-Lehane Classification: grade I - full view of glottis  Placement verified by: chest auscultation and capnometry   Cuff volume (mL): 8  Measured from: lips  ETT/EBT  to lips (cm): 21  Number of attempts at approach: 1  Assessment: lips, teeth, and gum same as pre-op and atraumatic intubation

## 2023-04-20 ENCOUNTER — TRANSCRIBE ORDERS (OUTPATIENT)
Dept: LAB | Facility: HOSPITAL | Age: 58
End: 2023-04-20
Payer: MEDICAID

## 2023-04-20 ENCOUNTER — LAB (OUTPATIENT)
Dept: LAB | Facility: HOSPITAL | Age: 58
End: 2023-04-20
Payer: MEDICAID

## 2023-04-20 DIAGNOSIS — R73.09 IMPAIRED GLUCOSE TOLERANCE TEST: ICD-10-CM

## 2023-04-20 DIAGNOSIS — Z13.228 SCREENING FOR PHENYLKETONURIA (PKU): ICD-10-CM

## 2023-04-20 DIAGNOSIS — Z13.228 SCREENING FOR PHENYLKETONURIA (PKU): Primary | ICD-10-CM

## 2023-04-20 LAB
ALBUMIN SERPL-MCNC: 4.3 G/DL (ref 3.5–5.2)
ALBUMIN/GLOB SERPL: 1.4 G/DL
ALP SERPL-CCNC: 76 U/L (ref 39–117)
ALT SERPL W P-5'-P-CCNC: 55 U/L (ref 1–41)
ANION GAP SERPL CALCULATED.3IONS-SCNC: 9.8 MMOL/L (ref 5–15)
AST SERPL-CCNC: 44 U/L (ref 1–40)
BILIRUB SERPL-MCNC: 0.5 MG/DL (ref 0–1.2)
BUN SERPL-MCNC: 17 MG/DL (ref 6–20)
BUN/CREAT SERPL: 17.7 (ref 7–25)
CALCIUM SPEC-SCNC: 9.8 MG/DL (ref 8.6–10.5)
CHLORIDE SERPL-SCNC: 108 MMOL/L (ref 98–107)
CO2 SERPL-SCNC: 25.2 MMOL/L (ref 22–29)
CREAT SERPL-MCNC: 0.96 MG/DL (ref 0.76–1.27)
EGFRCR SERPLBLD CKD-EPI 2021: 91.6 ML/MIN/1.73
GLOBULIN UR ELPH-MCNC: 3.1 GM/DL
GLUCOSE SERPL-MCNC: 90 MG/DL (ref 65–99)
HBA1C MFR BLD: 5.3 % (ref 4.8–5.6)
POTASSIUM SERPL-SCNC: 4.6 MMOL/L (ref 3.5–5.2)
PROT SERPL-MCNC: 7.4 G/DL (ref 6–8.5)
SODIUM SERPL-SCNC: 143 MMOL/L (ref 136–145)

## 2023-04-20 PROCEDURE — 36415 COLL VENOUS BLD VENIPUNCTURE: CPT

## 2023-04-20 PROCEDURE — 83036 HEMOGLOBIN GLYCOSYLATED A1C: CPT

## 2023-04-20 PROCEDURE — 80053 COMPREHEN METABOLIC PANEL: CPT

## 2023-06-01 DIAGNOSIS — I10 ESSENTIAL HYPERTENSION: ICD-10-CM

## 2023-06-01 RX ORDER — HYDROCHLOROTHIAZIDE 25 MG/1
TABLET ORAL
Qty: 30 TABLET | Refills: 0 | OUTPATIENT
Start: 2023-06-01

## 2023-06-01 RX ORDER — LISINOPRIL 10 MG/1
TABLET ORAL
Qty: 30 TABLET | Refills: 0 | OUTPATIENT
Start: 2023-06-01

## 2024-01-16 ENCOUNTER — OFFICE VISIT (OUTPATIENT)
Dept: INTERNAL MEDICINE | Facility: CLINIC | Age: 59
End: 2024-01-16
Payer: MEDICAID

## 2024-01-16 VITALS
WEIGHT: 262 LBS | BODY MASS INDEX: 37.51 KG/M2 | HEIGHT: 70 IN | DIASTOLIC BLOOD PRESSURE: 94 MMHG | HEART RATE: 82 BPM | SYSTOLIC BLOOD PRESSURE: 160 MMHG | OXYGEN SATURATION: 97 % | TEMPERATURE: 97.7 F

## 2024-01-16 DIAGNOSIS — M06.9 RHEUMATOID ARTHRITIS, INVOLVING UNSPECIFIED SITE, UNSPECIFIED WHETHER RHEUMATOID FACTOR PRESENT: ICD-10-CM

## 2024-01-16 DIAGNOSIS — F20.0 PARANOID SCHIZOPHRENIA: ICD-10-CM

## 2024-01-16 DIAGNOSIS — J44.9 CHRONIC OBSTRUCTIVE PULMONARY DISEASE, UNSPECIFIED COPD TYPE: ICD-10-CM

## 2024-01-16 DIAGNOSIS — F44.81: ICD-10-CM

## 2024-01-16 DIAGNOSIS — Z72.0 TOBACCO USE: ICD-10-CM

## 2024-01-16 DIAGNOSIS — I10 ESSENTIAL HYPERTENSION: Primary | ICD-10-CM

## 2024-01-16 DIAGNOSIS — Z00.00 HEALTHCARE MAINTENANCE: ICD-10-CM

## 2024-01-16 DIAGNOSIS — F31.9 BIPOLAR 1 DISORDER: ICD-10-CM

## 2024-01-16 DIAGNOSIS — B18.2 CHRONIC HEPATITIS C WITHOUT HEPATIC COMA: ICD-10-CM

## 2024-01-16 PROCEDURE — 3077F SYST BP >= 140 MM HG: CPT | Performed by: INTERNAL MEDICINE

## 2024-01-16 PROCEDURE — 99214 OFFICE O/P EST MOD 30 MIN: CPT | Performed by: INTERNAL MEDICINE

## 2024-01-16 PROCEDURE — 90471 IMMUNIZATION ADMIN: CPT | Performed by: INTERNAL MEDICINE

## 2024-01-16 PROCEDURE — 90686 IIV4 VACC NO PRSV 0.5 ML IM: CPT | Performed by: INTERNAL MEDICINE

## 2024-01-16 PROCEDURE — 3080F DIAST BP >= 90 MM HG: CPT | Performed by: INTERNAL MEDICINE

## 2024-01-16 RX ORDER — HYDROCHLOROTHIAZIDE 25 MG/1
25 TABLET ORAL DAILY
Qty: 30 TABLET | Refills: 2 | Status: SHIPPED | OUTPATIENT
Start: 2024-01-16 | End: 2024-01-16 | Stop reason: SDUPTHER

## 2024-01-16 RX ORDER — HYDROCHLOROTHIAZIDE 25 MG/1
25 TABLET ORAL DAILY
Qty: 30 TABLET | Refills: 2 | Status: SHIPPED | OUTPATIENT
Start: 2024-01-16

## 2024-01-16 RX ORDER — LISINOPRIL 10 MG/1
10 TABLET ORAL DAILY
Qty: 30 TABLET | Refills: 2 | Status: SHIPPED | OUTPATIENT
Start: 2024-01-16

## 2024-01-16 RX ORDER — ALBUTEROL SULFATE 90 UG/1
2 AEROSOL, METERED RESPIRATORY (INHALATION) EVERY 4 HOURS PRN
Qty: 18 G | Refills: 5 | Status: SHIPPED | OUTPATIENT
Start: 2024-01-16 | End: 2024-01-16 | Stop reason: SDUPTHER

## 2024-01-16 RX ORDER — ALBUTEROL SULFATE 90 UG/1
2 AEROSOL, METERED RESPIRATORY (INHALATION) EVERY 4 HOURS PRN
Qty: 18 G | Refills: 5 | Status: SHIPPED | OUTPATIENT
Start: 2024-01-16

## 2024-01-16 RX ORDER — LISINOPRIL 10 MG/1
10 TABLET ORAL DAILY
Qty: 30 TABLET | Refills: 2 | Status: SHIPPED | OUTPATIENT
Start: 2024-01-16 | End: 2024-01-16 | Stop reason: SDUPTHER

## 2024-01-16 NOTE — PROGRESS NOTES
Chief Complaint   Patient presents with    Follow-up    Hypertension     Would like to restart lisinopril 10 mg and HCTZ 25 mg         History:  Arsen Calderón is a 58 y.o. male who presents today for evaluation of the above problems.      [facility]  He was last seen in June 2021.  Shortly after that office visit he went back to long term and recently got out 2 months ago.  He needs to get back on his medications including lisinopril and hydrochlorothiazide.    He states he was not able to go to the referrals placed at the last visit.    He also wants a refill on his Concerta which he states is for anxiety and depression.    He would like referral to pain management for his rheumatoid arthritis.    He states that hepatitis C was checked again when he went to long term.  He states that he is cured from hepatitis C.        HPI      ROS:  Review of Systems   Constitutional:  Positive for activity change.   Respiratory:          COPD   Cardiovascular: Negative.    Musculoskeletal:  Positive for arthralgias, back pain and gait problem.         Current Outpatient Medications:     albuterol sulfate  (90 Base) MCG/ACT inhaler, Inhale 2 puffs Every 4 (Four) Hours As Needed for Wheezing., Disp: 18 g, Rfl: 5    hydroCHLOROthiazide (HYDRODIURIL) 25 MG tablet, Take 1 tablet by mouth Daily., Disp: 30 tablet, Rfl: 2    lisinopril (PRINIVIL,ZESTRIL) 10 MG tablet, Take 1 tablet by mouth Daily., Disp: 30 tablet, Rfl: 2    Lab Results   Component Value Date    GLUCOSE 90 04/20/2023    BUN 17 04/20/2023    CREATININE 0.96 04/20/2023    EGFR 91.6 04/20/2023    BCR 17.7 04/20/2023    K 4.6 04/20/2023    CO2 25.2 04/20/2023    CALCIUM 9.8 04/20/2023    ALBUMIN 4.3 04/20/2023    BILITOT 0.5 04/20/2023    AST 44 (H) 04/20/2023    ALT 55 (H) 04/20/2023       WBC   Date Value Ref Range Status   06/09/2021 7.87 3.40 - 10.80 10*3/mm3 Final     RBC   Date Value Ref Range Status   06/09/2021 5.70 4.14 - 5.80  10*6/mm3 Final     Hemoglobin   Date Value Ref Range Status   06/09/2021 17.3 13.0 - 17.7 g/dL Final     Hematocrit   Date Value Ref Range Status   06/09/2021 51.4 (H) 37.5 - 51.0 % Final     MCV   Date Value Ref Range Status   06/09/2021 90.2 79.0 - 97.0 fL Final     MCH   Date Value Ref Range Status   06/09/2021 30.4 26.6 - 33.0 pg Final     MCHC   Date Value Ref Range Status   06/09/2021 33.7 31.5 - 35.7 g/dL Final     RDW   Date Value Ref Range Status   06/09/2021 13.1 12.3 - 15.4 % Final     RDW-SD   Date Value Ref Range Status   06/09/2021 43.2 37.0 - 54.0 fl Final     MPV   Date Value Ref Range Status   06/09/2021 12.2 (H) 6.0 - 12.0 fL Final     Platelets   Date Value Ref Range Status   06/09/2021 166 140 - 450 10*3/mm3 Final     Neutrophil %   Date Value Ref Range Status   06/09/2021 68.2 42.7 - 76.0 % Final     Lymphocyte %   Date Value Ref Range Status   06/09/2021 22.1 19.6 - 45.3 % Final     Monocyte %   Date Value Ref Range Status   06/09/2021 5.5 5.0 - 12.0 % Final     Eosinophil %   Date Value Ref Range Status   06/09/2021 3.2 0.3 - 6.2 % Final     Basophil %   Date Value Ref Range Status   06/09/2021 0.6 0.0 - 1.5 % Final     Immature Grans %   Date Value Ref Range Status   06/09/2021 0.4 0.0 - 0.5 % Final     Neutrophils, Absolute   Date Value Ref Range Status   06/09/2021 5.37 1.70 - 7.00 10*3/mm3 Final     Lymphocytes, Absolute   Date Value Ref Range Status   06/09/2021 1.74 0.70 - 3.10 10*3/mm3 Final     Monocytes, Absolute   Date Value Ref Range Status   06/09/2021 0.43 0.10 - 0.90 10*3/mm3 Final     Eosinophils, Absolute   Date Value Ref Range Status   06/09/2021 0.25 0.00 - 0.40 10*3/mm3 Final     Basophils, Absolute   Date Value Ref Range Status   06/09/2021 0.05 0.00 - 0.20 10*3/mm3 Final     Immature Grans, Absolute   Date Value Ref Range Status   06/09/2021 0.03 0.00 - 0.05 10*3/mm3 Final     nRBC   Date Value Ref Range Status   06/09/2021 0.0 0.0 - 0.2 /100 WBC Final  "        OBJECTIVE:  Visit Vitals  /94 (BP Location: Left arm, Patient Position: Sitting, Cuff Size: Adult)   Pulse 82   Temp 97.7 °F (36.5 °C) (Temporal)   Ht 177.8 cm (70\")   Wt 119 kg (262 lb)   SpO2 97%   BMI 37.59 kg/m²      Physical Exam  Constitutional:       General: He is not in acute distress.     Appearance: He is well-developed. He is not ill-appearing or toxic-appearing.   HENT:      Head: Normocephalic and atraumatic.   Eyes:      Pupils: Pupils are equal, round, and reactive to light.   Neck:      Thyroid: No thyromegaly.      Trachea: Phonation normal.   Cardiovascular:      Rate and Rhythm: Normal rate and regular rhythm.      Heart sounds: No murmur heard.  Pulmonary:      Effort: Pulmonary effort is normal. No respiratory distress.      Breath sounds: Normal breath sounds. No wheezing or rales.   Musculoskeletal:      Right lower leg: No edema.      Left lower leg: No edema.   Skin:     Coloration: Skin is not pale.      Findings: No erythema.   Neurological:      Mental Status: He is alert.   Psychiatric:         Behavior: Behavior normal.         Thought Content: Thought content normal.         Judgment: Judgment normal.         Assessment/Plan    Diagnoses and all orders for this visit:    1. Essential hypertension (Primary)  -     Discontinue: hydroCHLOROthiazide (HYDRODIURIL) 25 MG tablet; Take 1 tablet by mouth Daily.  Dispense: 30 tablet; Refill: 2  -     Comprehensive Metabolic Panel; Future  -     Discontinue: lisinopril (PRINIVIL,ZESTRIL) 10 MG tablet; Take 1 tablet by mouth Daily.  Dispense: 30 tablet; Refill: 2  -     hydroCHLOROthiazide (HYDRODIURIL) 25 MG tablet; Take 1 tablet by mouth Daily.  Dispense: 30 tablet; Refill: 2  -     lisinopril (PRINIVIL,ZESTRIL) 10 MG tablet; Take 1 tablet by mouth Daily.  Dispense: 30 tablet; Refill: 2    2. Paranoid schizophrenia  -     Ambulatory Referral to Psychiatry    3. Multiple personality  -     Ambulatory Referral to Psychiatry    4. " Bipolar 1 disorder  -     Ambulatory Referral to Psychiatry    5. Chronic obstructive pulmonary disease, unspecified COPD type  -     Discontinue: albuterol sulfate  (90 Base) MCG/ACT inhaler; Inhale 2 puffs Every 4 (Four) Hours As Needed for Wheezing.  Dispense: 18 g; Refill: 5  -     albuterol sulfate  (90 Base) MCG/ACT inhaler; Inhale 2 puffs Every 4 (Four) Hours As Needed for Wheezing.  Dispense: 18 g; Refill: 5    6. Tobacco use    7. Chronic hepatitis C without hepatic coma    8. Healthcare maintenance  -     CBC (No Diff); Future  -     Comprehensive Metabolic Panel; Future  -     Hemoglobin A1c; Future  -     Lipid Panel; Future    9. Rheumatoid arthritis, involving unspecified site, unspecified whether rheumatoid factor present  -     Ambulatory Referral to Pain Management    Other orders  -     Fluzone >6 Months ([id number])      Prabha blood pressure is uncontrolled at 160/94.  Restart hydrochlorothiazide and lisinopril 10 mg.  Obtain some updated blood work today as well.    Refer to psychiatry.    Referred to pain management for rheumatoid arthritis    Recommend tobacco cessation.    He reports that he does not have active hepatitis C infection.    Follow-up in 4 weeks to recheck blood pressure, or sooner if needed.    Return in about 4 weeks (around 2/13/2024) for Recheck BP.      CHRISTOPHER Cuadra MD  14:50 CST  1/16/2024   Electronically signed

## 2024-01-19 ENCOUNTER — TELEPHONE (OUTPATIENT)
Dept: INTERNAL MEDICINE | Facility: CLINIC | Age: 59
End: 2024-01-19

## 2024-01-19 NOTE — TELEPHONE ENCOUNTER
Caller: Arsen Calderón    Relationship: Self    Best call back number:  [phone]    What form or medical record are you requesting:     RECORD SHOWING PATIENT HAD A FLU SHOT ON TUESDAY 01.16.24.    Who is requesting this form or medical record from you:     PATIENT - PATIENT GETS CREDIT FROM [employer] FOR HAVING THAT INFORMATION.     IT WAS NOT ON HIS DISCHARGE PAPERS    How would you like to receive the form or medical records         Timeframe paperwork needed:     SOON    Additional notes:     PLEASE CALL PATIENT WHEN COMPLETED AND READY FOR PICKUP

## 2024-02-20 ENCOUNTER — TELEPHONE (OUTPATIENT)
Dept: INTERNAL MEDICINE | Facility: CLINIC | Age: 59
End: 2024-02-20

## 2024-02-20 NOTE — TELEPHONE ENCOUNTER
SPOKE TO PT AND LET HIM KNOW WE HAVE A NO SHOW POLICY AND AFTER THREE HE CAN BE DISMISSED FROM THE PRACTICE. HE RESCHEDULED APPOINTMENT FOR 02/23/2024

## 2024-02-23 ENCOUNTER — TELEPHONE (OUTPATIENT)
Dept: INTERNAL MEDICINE | Facility: CLINIC | Age: 59
End: 2024-02-23

## 2024-02-28 ENCOUNTER — HOSPITAL ENCOUNTER (OUTPATIENT)
Dept: PAIN MANAGEMENT | Age: 59
Discharge: HOME | End: 2024-02-28

## 2024-02-28 RX ORDER — LISINOPRIL 10 MG/1
10 TABLET ORAL DAILY
COMMUNITY

## 2024-02-28 RX ORDER — HYDROCHLOROTHIAZIDE 25 MG/1
25 TABLET ORAL DAILY
COMMUNITY

## 2024-02-28 ASSESSMENT — ENCOUNTER SYMPTOMS
CONSTIPATION: 0
BACK PAIN: 0

## 2024-02-28 NOTE — H&P
[facility]    History and Physical    Patient Name: Robin Boykin    MR #: [id number]    Account #:[id number]    : 1965    Age: 58 y.o.    Sex: male    Date: 24    PCP: No primary care provider on file.    Referring Provider:    Chief Complaint: No chief complaint on file.      History of Present Illness:   The patient is a 58 y.o. male who was referrred with primary complaints of ***.      Does pain affect ADLs {YES / NO:} {AWV ADL FUNCTIONAL:8837809262}  Does pain affect quality of life? {YES / NO:}  Does pain affect activities of enjoyment? {YES / NO:}  Have you been on pain medication for your pain? {YES / NO:}  If so, does the pain medication keep your pain tolerable to perform the tasks that affect your pain? {YES / NO:}     Of Note: This is a work related injury {YES / NO:}      Patient is on or has tried and failed following medications:   Anticonvulsant: {MEDS; ANTICONVULSANTS:20419};  Antidepressant: none;   Muscle relaxer:   none ,   NSAID: ibuprofen  Narcotics: none;     Past Pain Management History  {PAIN INTERVENTIONAL PROCEDURES:89668}    {PAIN MANAG PROCEDURES PAIN MGT:949334784}    Past Imaging  {RADIOLOGY IMAGES:203001772}    Past Therapy  ***  Has patient continued to complete (HEP) home exercise program as instructed by therapy?  {YES / NO:}    Screening Tools:     PEG: ***    Past PEG: NA    ORT: ***    PHQ-9: ***    Current Pain Assessment       Past Visit HPI:  NA    Employment:     Past Medical History  Past Medical History:   Diagnosis Date    Hernia, abdominal     Obese        Allergies  Codeine    Current Medications  Current Outpatient Medications   Medication Sig Dispense Refill    ibuprofen (ADVIL;MOTRIN) 800 MG tablet Take 1 tablet by mouth every 6 hours as needed for Pain Take with food. 20 tablet 0     No current facility-administered medications for this encounter.       Social History

## 2024-03-04 ENCOUNTER — TELEPHONE (OUTPATIENT)
Dept: INTERNAL MEDICINE | Facility: CLINIC | Age: 59
End: 2024-03-04
Payer: MEDICAID

## 2024-03-21 ENCOUNTER — OFFICE VISIT (OUTPATIENT)
Dept: FAMILY MEDICINE CLINIC | Facility: CLINIC | Age: 59
End: 2024-03-21
Payer: MEDICAID

## 2024-03-21 VITALS
HEART RATE: 88 BPM | DIASTOLIC BLOOD PRESSURE: 102 MMHG | TEMPERATURE: 98.7 F | OXYGEN SATURATION: 97 % | HEIGHT: 70 IN | WEIGHT: 254 LBS | SYSTOLIC BLOOD PRESSURE: 164 MMHG | BODY MASS INDEX: 36.36 KG/M2 | RESPIRATION RATE: 20 BRPM

## 2024-03-21 DIAGNOSIS — F31.9 BIPOLAR 1 DISORDER: ICD-10-CM

## 2024-03-21 DIAGNOSIS — Z86.19 HISTORY OF HEPATITIS C: ICD-10-CM

## 2024-03-21 DIAGNOSIS — F44.81: ICD-10-CM

## 2024-03-21 DIAGNOSIS — I10 ESSENTIAL HYPERTENSION: ICD-10-CM

## 2024-03-21 DIAGNOSIS — Z00.00 ENCOUNTER FOR MEDICAL EXAMINATION TO ESTABLISH CARE: Primary | ICD-10-CM

## 2024-03-21 DIAGNOSIS — F20.0 PARANOID SCHIZOPHRENIA: ICD-10-CM

## 2024-03-21 DIAGNOSIS — M06.9 RHEUMATOID ARTHRITIS, INVOLVING UNSPECIFIED SITE, UNSPECIFIED WHETHER RHEUMATOID FACTOR PRESENT: ICD-10-CM

## 2024-03-21 RX ORDER — LOSARTAN POTASSIUM 50 MG/1
50 TABLET ORAL DAILY
Qty: 30 TABLET | Refills: 1 | Status: SHIPPED | OUTPATIENT
Start: 2024-03-21

## 2024-03-21 RX ORDER — GABAPENTIN 600 MG/1
600 TABLET ORAL 3 TIMES DAILY
Qty: 90 TABLET | Refills: 0 | Status: SHIPPED | OUTPATIENT
Start: 2024-03-21

## 2024-03-21 RX ORDER — LUMATEPERONE 10.5 MG/1
42 CAPSULE ORAL DAILY
Qty: 7 CAPSULE | Refills: 0 | COMMUNITY
Start: 2024-03-21

## 2024-03-21 RX ORDER — LUMATEPERONE 21 MG/1
21 CAPSULE ORAL DAILY
Qty: 7 CAPSULE | Refills: 0 | COMMUNITY
Start: 2024-03-21

## 2024-03-21 RX ORDER — LUMATEPERONE 42 MG/1
42 CAPSULE ORAL DAILY
Qty: 7 CAPSULE | Refills: 0 | COMMUNITY
Start: 2024-03-21

## 2024-03-21 NOTE — PROGRESS NOTES
Chief Complaint  Establish Care and Hypertension    Subjective        Arsen Calderón presents to [facility] PRIMARY [facility]ARE    HPI      Establish care    Was in detention for several years.  Got out about 3 years ago. Wanted to make PCP switch. He has h/o schizophrenia, bipolar 1, multiple personality disorder.   Has been on seroquel among other mental health medications that he cannot remember.  [facility]aw [facility], was placed on vraylar. Says this isn't working for him, having bad dreams, sweating with medicine.  He has since called [address] [place] [address] and made appointment.    Hypertension  -Has been uncontrolled.  He did not tolerate lisinopril due to side effects.  Has not been taking hydrochlorothiazide either that is been on his medication list.    Rheumatoid arthritis  -Patient states she has had this for several years, has not seen rheumatologist.  Symptoms have been treated with gabapentin which she says has helped quite a lot.  Rheumatoid factor elevated on previous labs from 2021.  He has discomfort in multiple joints.  He wants to see pain management.      Past Medical History:   Diagnosis Date    Bell's palsy     Bipolar 1 disorder     Concussion     COPD (chronic obstructive pulmonary disease)     Hepatitis C     Hernia, abdominal     Hypertension     Lupus     Multiple personality     Neuropathy     Paranoid schizophrenia     Rheumatoid arthritis     Shoulder pain     Testicular pain      Past Surgical History:   Procedure Laterality Date    INGUINAL HERNIA REPAIR Right 6/10/2021    Procedure: ROBOTIC RIGHT INGUINAL HERNIA REPAIR WITH POSSIBLE MESH;  Surgeon: Molly Jean MD;  Location:  [facility] OR;  Service: [facility];  Laterality: Right;    UMBILICAL HERNIA REPAIR N/A 6/10/2021    Procedure: ROBOTIC UMBILICAL HERNIA REPAIR WITH POSSIBLE MESH, ROBOTIC RIGHT INGUINAL HERNIA REPAIR WITH POSSIBLE MESH;  Surgeon: Molly Jean MD;  Location:  [facility] OR;  Service: [facility] -  "[place];  Laterality: N/A;     Social History     Socioeconomic History    Marital status: Single   Tobacco Use    Smoking status: Every Day     Current packs/day: 1.00     Average packs/day: 1 pack/day for 21.0 years (21.0 ttl pk-yrs)     Types: Cigarettes    Smokeless tobacco: Never   Vaping Use    Vaping status: Every Day   Substance and Sexual Activity    Alcohol use: Never    Drug use: Yes     Types: Marijuana     Comment: FOR PAIN    Sexual activity: Yes       Objective   Vital Signs:  BP (!) 164/102   Pulse 88   Temp 98.7 °F (37.1 °C) (Temporal)   Resp 20   Ht 177.8 cm (70\")   Wt 115 kg (254 lb)   SpO2 97%   BMI 36.45 kg/m²   Estimated body mass index is 36.45 kg/m² as calculated from the following:    Height as of this encounter: 177.8 cm (70\").    Weight as of this encounter: 115 kg (254 lb).       Class 2 Severe Obesity (BMI >=35 and <=39.9). Obesity-related health conditions include the following: hypertension and dyslipidemias. Obesity is newly identified. BMI is is above average; BMI management plan is completed. We discussed portion control and increasing exercise.      Physical Exam  Vitals reviewed.   Constitutional:       Appearance: Normal appearance.   HENT:      Head: Normocephalic.      Nose: Nose normal.      Mouth/Throat:      Mouth: Mucous membranes are moist.   Eyes:      Extraocular Movements: Extraocular movements intact.   Cardiovascular:      Rate and Rhythm: Normal rate and regular rhythm.      Heart sounds: Normal heart sounds.   Pulmonary:      Effort: Pulmonary effort is normal.      Breath sounds: Normal breath sounds.   Musculoskeletal:         General: Normal range of motion.      Cervical back: Normal range of motion.   Skin:     General: Skin is warm and dry.   Neurological:      General: No focal deficit present.      Mental Status: He is alert and oriented to person, place, and time.   Psychiatric:         Mood and Affect: Mood normal.         Behavior: Behavior normal. "        Result Review :                   Assessment and Plan   Diagnoses and all orders for this visit:    1. Encounter for medical examination to establish care (Primary)    2. Rheumatoid arthritis, involving unspecified site, unspecified whether rheumatoid factor present  -Recommend rheumatology eval for DMARD's. Ok to continue gabapentin. Will fu on this at next visit.  -     Ambulatory Referral to Pain Management  -     gabapentin (NEURONTIN) 600 MG tablet; Take 1 tablet by mouth 3 (Three) Times a Day.  Dispense: 90 tablet; Refill: 0  -     Urine Drug Screen - Urine, Clean Catch; Future  -     Ambulatory Referral to Rheumatology  -     Comprehensive metabolic panel; Future  -     CBC w AUTO Differential; Future  -     TSH Rfx On Abnormal To Free T4; Future  -     MEGAN; Future  -     Rheumatoid Factor; Future  -     C-reactive protein; Future  -     Uric acid; Future  -     Microalbumin / Creatinine Urine Ratio - Urine, Clean Catch; Future  -     Vitamin D 25 hydroxy; Future  -     Vitamin B12; Future  -     Folate; Future    3. Essential hypertension  -Restart antihypertensive with losartan and titrate up as needed. DC hctz for now.   -     losartan (Cozaar) 50 MG tablet; Take 1 tablet by mouth Daily.  Dispense: 30 tablet; Refill: 1  -     Comprehensive metabolic panel; Future  -     Microalbumin / Creatinine Urine Ratio - Urine, Clean Catch; Future  -     Lipid panel; Future    4. Paranoid schizophrenia  5. Multiple personality  6. Bipolar 1 disorder  -Pt to see [facility] psychiatry. Provided 3 week course of caplyta samples for 10.5 mg, 21 mg, and 42 mg to try in mean time. DC vraylar    7. History of hepatitis C  -     Hepatitis C RNA, Quantitative, PCR (graph); Future    FU 2 weeks after labs     I spent 40 minutes caring for Arsen on this date of service. This time includes time spent by me in the following activities:preparing for the visit, reviewing tests, obtaining and/or reviewing a separately obtained  history, counseling and educating the patient/family/caregiver, ordering medications, tests, or procedures, referring and communicating with other health care professionals , documenting information in the medical record, independently interpreting results and communicating that information with the patient/family/caregiver, and care coordination  EMR Dragon/Transcription disclaimer:   Much of this encounter note is an electronic transcription/translation of spoken language to printed text. The electronic translation of spoken language may permit erroneous, or at times, nonsensical words or phrases to be inadvertently transcribed; although attempts have made to review the note for such errors, some may still exist. Please excuse any unrecognized transcription errors and contact us if the air is unintelligible or needs documented correction. Also, portions of this note have been copied forward, however, changed to reflect the most current clinical status of this patient.  Follow Up   Return in about 2 weeks (around 4/4/2024).  Patient was given instructions and counseling regarding his condition or for health maintenance advice. Please see specific information pulled into the AVS if appropriate.

## 2024-03-22 NOTE — PROGRESS NOTES
Patient and I had a conversation about No Show policy and the possible termination if he no shows 3 or more appointments also the possibility of termination if patient is non compliant

## 2024-04-01 ENCOUNTER — LAB (OUTPATIENT)
Dept: LAB | Facility: HOSPITAL | Age: 59
End: 2024-04-01
Payer: MEDICAID

## 2024-04-01 DIAGNOSIS — M06.9 RHEUMATOID ARTHRITIS, INVOLVING UNSPECIFIED SITE, UNSPECIFIED WHETHER RHEUMATOID FACTOR PRESENT: ICD-10-CM

## 2024-04-01 DIAGNOSIS — Z86.19 HISTORY OF HEPATITIS C: ICD-10-CM

## 2024-04-01 DIAGNOSIS — I10 ESSENTIAL HYPERTENSION: ICD-10-CM

## 2024-04-01 LAB
ALBUMIN SERPL-MCNC: 3.9 G/DL (ref 3.5–5)
ALBUMIN/GLOB SERPL: 1.1 G/DL (ref 1.1–2.5)
ALP SERPL-CCNC: 74 U/L (ref 24–120)
ALT SERPL W P-5'-P-CCNC: 62 U/L (ref 0–50)
AMPHET+METHAMPHET UR QL: NEGATIVE
AMPHETAMINES UR QL: NEGATIVE
ANION GAP SERPL CALCULATED.3IONS-SCNC: 7 MMOL/L (ref 4–13)
AST SERPL-CCNC: 51 U/L (ref 7–45)
AUTO MIXED CELLS #: 0.7 10*3/MM3 (ref 0.1–2.6)
AUTO MIXED CELLS %: 8.3 % (ref 0.1–24)
BARBITURATES UR QL SCN: NEGATIVE
BENZODIAZ UR QL SCN: NEGATIVE
BILIRUB SERPL-MCNC: 0.7 MG/DL (ref 0.1–1)
BUN SERPL-MCNC: 23 MG/DL (ref 5–21)
BUN/CREAT SERPL: 16
BUPRENORPHINE SERPL-MCNC: NEGATIVE NG/ML
CALCIUM SPEC-SCNC: 9.3 MG/DL (ref 8.4–10.4)
CANNABINOIDS SERPL QL: POSITIVE
CHLORIDE SERPL-SCNC: 107 MMOL/L (ref 98–110)
CHOLEST SERPL-MCNC: 116 MG/DL (ref 130–200)
CO2 SERPL-SCNC: 26 MMOL/L (ref 24–31)
COCAINE UR QL: NEGATIVE
CREAT SERPL-MCNC: 1.44 MG/DL (ref 0.5–1.4)
EGFRCR SERPLBLD CKD-EPI 2021: 56.3 ML/MIN/1.73
ERYTHROCYTE [DISTWIDTH] IN BLOOD BY AUTOMATED COUNT: 12.6 % (ref 12.3–15.4)
FENTANYL UR-MCNC: NEGATIVE NG/ML
GLOBULIN UR ELPH-MCNC: 3.4 GM/DL
GLUCOSE SERPL-MCNC: 107 MG/DL (ref 70–100)
HCT VFR BLD AUTO: 51.3 % (ref 37.5–51)
HDLC SERPL-MCNC: 33 MG/DL
HGB BLD-MCNC: 17 G/DL (ref 13–17.7)
LDLC SERPL CALC-MCNC: 53 MG/DL (ref 0–99)
LDLC/HDLC SERPL: 1.44 {RATIO}
LYMPHOCYTES # BLD AUTO: 1.9 10*3/MM3 (ref 0.7–3.1)
LYMPHOCYTES NFR BLD AUTO: 23.6 % (ref 19.6–45.3)
MCH RBC QN AUTO: 30.7 PG (ref 26.6–33)
MCHC RBC AUTO-ENTMCNC: 33.1 G/DL (ref 31.5–35.7)
MCV RBC AUTO: 92.6 FL (ref 79–97)
METHADONE UR QL SCN: NEGATIVE
NEUTROPHILS NFR BLD AUTO: 5.6 10*3/MM3 (ref 1.7–7)
NEUTROPHILS NFR BLD AUTO: 68.1 % (ref 42.7–76)
OPIATES UR QL: NEGATIVE
OXYCODONE UR QL SCN: NEGATIVE
PCP UR QL SCN: NEGATIVE
PLATELET # BLD AUTO: 161 10*3/MM3 (ref 140–450)
PMV BLD AUTO: 10.3 FL (ref 6–12)
POTASSIUM SERPL-SCNC: 4.1 MMOL/L (ref 3.5–5.3)
PROT SERPL-MCNC: 7.3 G/DL (ref 6.3–8.7)
RBC # BLD AUTO: 5.54 10*6/MM3 (ref 4.14–5.8)
SODIUM SERPL-SCNC: 140 MMOL/L (ref 135–145)
TRICYCLICS UR QL SCN: NEGATIVE
TRIGL SERPL-MCNC: 177 MG/DL (ref 0–149)
VLDLC SERPL-MCNC: 30 MG/DL (ref 5–40)
WBC NRBC COR # BLD AUTO: 8.2 10*3/MM3 (ref 3.4–10.8)

## 2024-04-01 PROCEDURE — 82043 UR ALBUMIN QUANTITATIVE: CPT

## 2024-04-01 PROCEDURE — 84550 ASSAY OF BLOOD/URIC ACID: CPT

## 2024-04-01 PROCEDURE — 85025 COMPLETE CBC W/AUTO DIFF WBC: CPT

## 2024-04-01 PROCEDURE — 84443 ASSAY THYROID STIM HORMONE: CPT

## 2024-04-01 PROCEDURE — 86140 C-REACTIVE PROTEIN: CPT

## 2024-04-01 PROCEDURE — 82306 VITAMIN D 25 HYDROXY: CPT

## 2024-04-01 PROCEDURE — 82607 VITAMIN B-12: CPT

## 2024-04-01 PROCEDURE — 36415 COLL VENOUS BLD VENIPUNCTURE: CPT

## 2024-04-01 PROCEDURE — 80061 LIPID PANEL: CPT

## 2024-04-01 PROCEDURE — 87522 HEPATITIS C REVRS TRNSCRPJ: CPT

## 2024-04-01 PROCEDURE — 80053 COMPREHEN METABOLIC PANEL: CPT

## 2024-04-01 PROCEDURE — 86038 ANTINUCLEAR ANTIBODIES: CPT

## 2024-04-01 PROCEDURE — 80307 DRUG TEST PRSMV CHEM ANLYZR: CPT

## 2024-04-01 PROCEDURE — 82570 ASSAY OF URINE CREATININE: CPT

## 2024-04-01 PROCEDURE — 83036 HEMOGLOBIN GLYCOSYLATED A1C: CPT

## 2024-04-01 PROCEDURE — 82746 ASSAY OF FOLIC ACID SERUM: CPT

## 2024-04-01 PROCEDURE — 86431 RHEUMATOID FACTOR QUANT: CPT

## 2024-04-02 DIAGNOSIS — M06.9 RHEUMATOID ARTHRITIS, INVOLVING UNSPECIFIED SITE, UNSPECIFIED WHETHER RHEUMATOID FACTOR PRESENT: ICD-10-CM

## 2024-04-02 LAB
25(OH)D3 SERPL-MCNC: 17.4 NG/ML (ref 30–100)
ALBUMIN UR-MCNC: <1.2 MG/DL
ANA SER QL: NEGATIVE
CHROMATIN AB SERPL-ACNC: 24.5 IU/ML (ref 0–14)
CREAT UR-MCNC: 287 MG/DL
CRP SERPL-MCNC: <0.3 MG/DL (ref 0–0.5)
FOLATE SERPL-MCNC: 5.79 NG/ML (ref 4.78–24.2)
MICROALBUMIN/CREAT UR: NORMAL MG/G{CREAT}
TSH SERPL DL<=0.05 MIU/L-ACNC: 1.55 UIU/ML (ref 0.27–4.2)
URATE SERPL-MCNC: 6.1 MG/DL (ref 3.4–7)
VIT B12 BLD-MCNC: 368 PG/ML (ref 211–946)

## 2024-04-02 RX ORDER — GABAPENTIN 600 MG/1
600 TABLET ORAL 3 TIMES DAILY
Qty: 90 TABLET | Refills: 4 | OUTPATIENT
Start: 2024-04-02

## 2024-04-03 LAB
HCV RNA SERPL NAA+PROBE-ACNC: NORMAL IU/ML
HCV RNA SERPL NAA+PROBE-LOG IU: 6.21 LOG10 IU/ML
TEST INFORMATION: NORMAL

## 2024-04-04 ENCOUNTER — OFFICE VISIT (OUTPATIENT)
Dept: FAMILY MEDICINE CLINIC | Facility: CLINIC | Age: 59
End: 2024-04-04
Payer: MEDICAID

## 2024-04-04 VITALS
SYSTOLIC BLOOD PRESSURE: 168 MMHG | DIASTOLIC BLOOD PRESSURE: 80 MMHG | HEIGHT: 70 IN | RESPIRATION RATE: 20 BRPM | HEART RATE: 84 BPM | BODY MASS INDEX: 36.22 KG/M2 | WEIGHT: 253 LBS | TEMPERATURE: 97.8 F | OXYGEN SATURATION: 97 %

## 2024-04-04 DIAGNOSIS — Z72.0 TOBACCO USE: ICD-10-CM

## 2024-04-04 DIAGNOSIS — K40.90 RIGHT INGUINAL HERNIA: ICD-10-CM

## 2024-04-04 DIAGNOSIS — B18.2 CHRONIC HEPATITIS C WITHOUT HEPATIC COMA: ICD-10-CM

## 2024-04-04 DIAGNOSIS — K42.9 UMBILICAL HERNIA WITHOUT OBSTRUCTION AND WITHOUT GANGRENE: ICD-10-CM

## 2024-04-04 DIAGNOSIS — I10 ESSENTIAL HYPERTENSION: Primary | ICD-10-CM

## 2024-04-04 DIAGNOSIS — E55.9 VITAMIN D DEFICIENCY: ICD-10-CM

## 2024-04-04 DIAGNOSIS — M06.9 RHEUMATOID ARTHRITIS, INVOLVING UNSPECIFIED SITE, UNSPECIFIED WHETHER RHEUMATOID FACTOR PRESENT: ICD-10-CM

## 2024-04-04 DIAGNOSIS — E78.5 DYSLIPIDEMIA: ICD-10-CM

## 2024-04-04 LAB — HBA1C MFR BLD: 5.2 % (ref 4.8–5.9)

## 2024-04-04 RX ORDER — ATORVASTATIN CALCIUM 10 MG/1
10 TABLET, FILM COATED ORAL DAILY
Qty: 90 TABLET | Refills: 5 | Status: SHIPPED | OUTPATIENT
Start: 2024-04-04

## 2024-04-04 RX ORDER — GABAPENTIN 800 MG/1
800 TABLET ORAL 3 TIMES DAILY
Qty: 90 TABLET | Refills: 1 | Status: SHIPPED | OUTPATIENT
Start: 2024-04-04

## 2024-04-04 RX ORDER — LOSARTAN POTASSIUM 100 MG/1
100 TABLET ORAL DAILY
Qty: 90 TABLET | Refills: 1 | Status: SHIPPED | OUTPATIENT
Start: 2024-04-04

## 2024-04-04 NOTE — PROGRESS NOTES
Chief Complaint  Labs Only    Subjective        Arsen Calderón presents to [facility] PRIMARY CARE    HPI    FU    Schizophrenia, Bipolar 1-Vraylar dc'pawan last visit. I gave him caplyta samples. He has completed 2 weeks up to 21 mg dose, starts 42 mg dose tonight. Mostly tolerates however has had strange dreams and woke himself up almost urinating on himself once. He is not sure if this is related to medicine or not. He sees [facility] tomorrow.    HTN-Losartan restarted last visit, 50mg. DBP improved. SBP is still elevated. He reports bp machine corroded. He left readings at home. Says he feels somewhat better from bp reduction    Hep C-Labs show persistent infection. Quant RNA [id number].    RA-Pt has been referred to rheumatology, has not heard from Dr. Clark's office. He has been taking gabapentin 600 mg since last visit, says helps sx tremendously. He has also been using cbd oil. Labs show elevated rheumatoid factor, 24.5, +THC    ASCVD risk calculated as 18% in 10 years  Vit D low, 17.4  Other labs unremarkable    Pt also notes concerns with pain and discomfort in sites of previous umbilical and inguinal hernia repair in 2021. Describes self-reducing umbilical hernia.    Past Medical History:   Diagnosis Date    Bell's palsy     Bipolar 1 disorder     Concussion     COPD (chronic obstructive pulmonary disease)     Hepatitis C     Hernia, abdominal     Hypertension     Lupus     Multiple personality     Neuropathy     Paranoid schizophrenia     Rheumatoid arthritis     Shoulder pain     Testicular pain      Past Surgical History:   Procedure Laterality Date    INGUINAL HERNIA REPAIR Right 6/10/2021    Procedure: ROBOTIC RIGHT INGUINAL HERNIA REPAIR WITH POSSIBLE MESH;  Surgeon: Molly Jean MD;  Location: [facility] OR;  Service: [facility] - [facility];  Laterality: Right;    UMBILICAL HERNIA REPAIR N/A 6/10/2021    Procedure: ROBOTIC UMBILICAL HERNIA REPAIR WITH POSSIBLE MESH, ROBOTIC RIGHT  "INGUINAL HERNIA REPAIR WITH POSSIBLE MESH;  Surgeon: Molly Jean MD;  Location: [facility] OR;  Service: [facility]otics - [facility];  Laterality: N/A;     Social History     Socioeconomic History    Marital status: Single   Tobacco Use    Smoking status: Every Day     Current packs/day: 1.00     Average packs/day: 1 pack/day for 21.0 years (21.0 ttl pk-yrs)     Types: Cigarettes    Smokeless tobacco: Never   Vaping Use    Vaping status: Every Day   Substance and Sexual Activity    Alcohol use: Never    Drug use: Yes     Types: Marijuana     Comment: FOR PAIN    Sexual activity: Yes       Objective   Vital Signs:  /80   Pulse 84   Temp 97.8 °F (36.6 °C) (Temporal)   Resp 20   Ht 177.8 cm (70\")   Wt 115 kg (253 lb)   SpO2 97%   BMI 36.30 kg/m²   Estimated body mass index is 36.3 kg/m² as calculated from the following:    Height as of this encounter: 177.8 cm (70\").    Weight as of this encounter: 115 kg (253 lb).              Physical Exam  Vitals reviewed.   Constitutional:       Appearance: Normal appearance.   HENT:      Head: Normocephalic.      Nose: Nose normal.      Mouth/Throat:      Mouth: Mucous membranes are moist.   Eyes:      Extraocular Movements: Extraocular movements intact.   Cardiovascular:      Rate and Rhythm: Normal rate and regular rhythm.      Heart sounds: Normal heart sounds.   Pulmonary:      Effort: Pulmonary effort is normal.      Breath sounds: Normal breath sounds.   Abdominal:      Comments: Umbilical hernia present, reducible.   Musculoskeletal:         General: Normal range of motion.      Cervical back: Normal range of motion.   Skin:     General: Skin is warm and dry.   Neurological:      General: No focal deficit present.      Mental Status: He is alert and oriented to person, place, and time.   Psychiatric:         Mood and Affect: Mood normal.         Behavior: Behavior normal.        Result Review :                   Assessment and Plan   Diagnoses and all orders for " this visit:    1. Essential hypertension (Primary)  -Uncontrolled. Increase losartan to 100 mg. Obtain home readings if able. FU 3 weeks  -     losartan (Cozaar) 100 MG tablet; Take 1 tablet by mouth Daily.  Dispense: 90 tablet; Refill: 1  -     Hemoglobin A1c; Future    2. Dyslipidemia  -Discussed r/b of statin addition given elevated ascvd risk  -     atorvastatin (Lipitor) 10 MG tablet; Take 1 tablet by mouth Daily.  Dispense: 90 tablet; Refill: 5    3. Rheumatoid arthritis, involving unspecified site, unspecified whether rheumatoid factor present  -Pt referred to rheumatology  -     gabapentin (Neurontin) 800 MG tablet; Take 1 tablet by mouth 3 (Three) Times a Day.  Dispense: 90 tablet; Refill: 1    4. Chronic hepatitis C without hepatic coma  -     Ambulatory Referral to Gastroenterology    5. Umbilical hernia without obstruction and without gangrene  -     Ambulatory Referral to General Surgery    6. Right inguinal hernia    7. Tobacco use  -Discussed recommendation for cessation. Will revisit this topic    8. Vitamin D deficiency  -Recommended otc 1000 IU daily    FU 3 weeks         I spent 50 minutes caring for Arsen on this date of service. This time includes time spent by me in the following activities:preparing for the visit, reviewing tests, performing a medically appropriate examination and/or evaluation , counseling and educating the patient/family/caregiver, ordering medications, tests, or procedures, referring and communicating with other health care professionals , documenting information in the medical record, independently interpreting results and communicating that information with the patient/family/caregiver, and care coordination  EMR Dragon/Transcription disclaimer:   Much of this encounter note is an electronic transcription/translation of spoken language to printed text. The electronic translation of spoken language may permit erroneous, or at times, nonsensical words or phrases to be  inadvertently transcribed; although attempts have made to review the note for such errors, some may still exist. Please excuse any unrecognized transcription errors and contact us if the air is unintelligible or needs documented correction. Also, portions of this note have been copied forward, however, changed to reflect the most current clinical status of this patient.  Follow Up   Return in about 3 weeks (around 4/25/2024).  Patient was given instructions and counseling regarding his condition or for health maintenance advice. Please see specific information pulled into the AVS if appropriate.

## 2024-04-04 NOTE — PROGRESS NOTES
"       Chief Complaint  Labs Only    Subjective        Arsen Calderón presents to [facility]    HPI    The ASCVD Risk score (Azael DK, et al., 2019) failed to calculate for the following reasons:    The valid total cholesterol range is 130 to 320 mg/dL    Pt states uses cbd oil for arthritis  Past Medical History:   Diagnosis Date    Bell's palsy     Bipolar 1 disorder     Concussion     COPD (chronic obstructive pulmonary disease)     Hepatitis C     Hernia, abdominal     Hypertension     Lupus     Multiple personality     Neuropathy     Paranoid schizophrenia     Rheumatoid arthritis     Shoulder pain     Testicular pain      Past Surgical History:   Procedure Laterality Date    INGUINAL HERNIA REPAIR Right 6/10/2021    Procedure: ROBOTIC RIGHT INGUINAL HERNIA REPAIR WITH POSSIBLE MESH;  Surgeon: Molly Jean MD;  Location:  [facility];  Service: Robotics - DaVinci;  Laterality: Right;    UMBILICAL HERNIA REPAIR N/A 6/10/2021    Procedure: ROBOTIC UMBILICAL HERNIA REPAIR WITH POSSIBLE MESH, ROBOTIC RIGHT INGUINAL HERNIA REPAIR WITH POSSIBLE MESH;  Surgeon: Molly Jean MD;  Location:  [facility];  Service: Robotics - DaVinci;  Laterality: N/A;     Social History     Socioeconomic History    Marital status: Single   Tobacco Use    Smoking status: Every Day     Current packs/day: 1.00     Average packs/day: 1 pack/day for 21.0 years (21.0 ttl pk-yrs)     Types: Cigarettes    Smokeless tobacco: Never   Vaping Use    Vaping status: Every Day   Substance and Sexual Activity    Alcohol use: Never    Drug use: Yes     Types: Marijuana     Comment: FOR PAIN    Sexual activity: Yes       Objective   Vital Signs:  /80   Pulse 84   Temp 97.8 °F (36.6 °C) (Temporal)   Resp 20   Ht 177.8 cm (70\")   Wt 115 kg (253 lb)   SpO2 97%   BMI 36.30 kg/m²   Estimated body mass index is 36.3 kg/m² as calculated from the following:    Height as of this encounter: 177.8 cm (70\").    " Weight as of this encounter: 115 kg (253 lb).              Physical Exam   Result Review :                   Assessment and Plan   There are no diagnoses linked to this encounter.         EMR Dragon/Transcription disclaimer:   Much of this encounter note is an electronic transcription/translation of spoken language to printed text. The electronic translation of spoken language may permit erroneous, or at times, nonsensical words or phrases to be inadvertently transcribed; although attempts have made to review the note for such errors, some may still exist. Please excuse any unrecognized transcription errors and contact us if the air is unintelligible or needs documented correction. Also, portions of this note have been copied forward, however, changed to reflect the most current clinical status of this patient.  Follow Up   No follow-ups on file.  Patient was given instructions and counseling regarding his condition or for health maintenance advice. Please see specific information pulled into the AVS if appropriate.

## 2024-04-25 ENCOUNTER — TELEPHONE (OUTPATIENT)
Dept: FAMILY MEDICINE CLINIC | Facility: CLINIC | Age: 59
End: 2024-04-25

## 2024-04-25 ENCOUNTER — OFFICE VISIT (OUTPATIENT)
Dept: FAMILY MEDICINE CLINIC | Facility: CLINIC | Age: 59
End: 2024-04-25
Payer: MEDICAID

## 2024-04-25 VITALS
HEART RATE: 83 BPM | BODY MASS INDEX: 36.22 KG/M2 | TEMPERATURE: 98.4 F | WEIGHT: 253 LBS | OXYGEN SATURATION: 97 % | DIASTOLIC BLOOD PRESSURE: 76 MMHG | SYSTOLIC BLOOD PRESSURE: 142 MMHG | HEIGHT: 70 IN | RESPIRATION RATE: 20 BRPM

## 2024-04-25 DIAGNOSIS — M06.9 RHEUMATOID ARTHRITIS, INVOLVING UNSPECIFIED SITE, UNSPECIFIED WHETHER RHEUMATOID FACTOR PRESENT: Primary | ICD-10-CM

## 2024-04-25 DIAGNOSIS — K40.90 RIGHT INGUINAL HERNIA: ICD-10-CM

## 2024-04-25 DIAGNOSIS — B18.2 CHRONIC HEPATITIS C WITHOUT HEPATIC COMA: ICD-10-CM

## 2024-04-25 DIAGNOSIS — R26.9 GAIT DISTURBANCE: ICD-10-CM

## 2024-04-25 DIAGNOSIS — Z12.11 COLON CANCER SCREENING: ICD-10-CM

## 2024-04-25 DIAGNOSIS — E78.5 DYSLIPIDEMIA: ICD-10-CM

## 2024-04-25 DIAGNOSIS — M06.9 RHEUMATOID ARTHRITIS, INVOLVING UNSPECIFIED SITE, UNSPECIFIED WHETHER RHEUMATOID FACTOR PRESENT: ICD-10-CM

## 2024-04-25 DIAGNOSIS — I10 ESSENTIAL HYPERTENSION: Primary | ICD-10-CM

## 2024-04-25 DIAGNOSIS — Z87.891 PERSONAL HISTORY OF TOBACCO USE, PRESENTING HAZARDS TO HEALTH: ICD-10-CM

## 2024-04-25 DIAGNOSIS — F17.290 NICOTINE DEPENDENCE, OTHER TOBACCO PRODUCT, UNCOMPLICATED: ICD-10-CM

## 2024-04-25 DIAGNOSIS — K42.9 UMBILICAL HERNIA WITHOUT OBSTRUCTION AND WITHOUT GANGRENE: ICD-10-CM

## 2024-04-25 RX ORDER — AMLODIPINE BESYLATE 5 MG/1
5 TABLET ORAL DAILY
Qty: 30 TABLET | Refills: 1 | Status: SHIPPED | OUTPATIENT
Start: 2024-04-25

## 2024-04-25 RX ORDER — TIZANIDINE HYDROCHLORIDE 2 MG/1
2 CAPSULE, GELATIN COATED ORAL 3 TIMES DAILY
Qty: 90 CAPSULE | Refills: 0 | Status: SHIPPED | OUTPATIENT
Start: 2024-04-25

## 2024-04-25 NOTE — TELEPHONE ENCOUNTER
Caller: Arsen Calderón    Relationship: Self    Best call back number: [phone]     What is the best time to reach you: ANY    Who are you requesting to speak with (clinical staff, provider,  specific staff member): CHANO    What was the call regarding:     PATIENT IS REQUESTING TO SPEAK TO CHANO REGARDING HIS PRESCRIPTIONS.     Is it okay if the provider responds through MyChart: PREFERS PHONE CALL

## 2024-04-25 NOTE — TELEPHONE ENCOUNTER
[facility] Dr[facility] Lazcano  Patient is aware

## 2024-04-25 NOTE — PROGRESS NOTES
Chief Complaint  Hypertension    Subjective        Arsen Calderón presents to [facility] PRIMARY [facility]    HPI    Here for FU    HTN  -On losartan 100 mg. This was increased at last visit. No home readings available. BP today 142/76.     HLD  -Started lipitor last visit, doing well on this so far    RA  -Referred to Dr. Tripp. Pt has appt 07/2024, trying to move up if able. Having muscle cramps all over. Requesting help w/ knee brace and cane until he sees rheum    Hep C  -Labs show active infection. He has GI appt 04/30    Inguinal/umbilical hernia  -Pt referred to Dr. Ramos, general surgery, has appt 05/2      Past Medical History:   Diagnosis Date    Bell's palsy     Bipolar 1 disorder     Concussion     COPD (chronic obstructive pulmonary disease)     Hepatitis C     Hernia, abdominal     Hypertension     Lupus     Multiple personality     Neuropathy     Paranoid schizophrenia     Rheumatoid arthritis     Shoulder pain     Testicular pain      Past Surgical History:   Procedure Laterality Date    INGUINAL HERNIA REPAIR Right 6/10/2021    Procedure: ROBOTIC RIGHT INGUINAL HERNIA REPAIR WITH POSSIBLE MESH;  Surgeon: Molly Jean MD;  Location:  [facility];  Service: Robotics - DaVinci;  Laterality: Right;    UMBILICAL HERNIA REPAIR N/A 6/10/2021    Procedure: ROBOTIC UMBILICAL HERNIA REPAIR WITH POSSIBLE MESH, ROBOTIC RIGHT INGUINAL HERNIA REPAIR WITH POSSIBLE MESH;  Surgeon: Molly Jean MD;  Location:  [facility];  Service: Robotics - DaVinci;  Laterality: N/A;     Social History     Socioeconomic History    Marital status: Single   Tobacco Use    Smoking status: Every Day     Current packs/day: 1.00     Average packs/day: 1 pack/day for 21.0 years (21.0 ttl pk-yrs)     Types: Cigarettes    Smokeless tobacco: Never   Vaping Use    Vaping status: Every Day   Substance and Sexual Activity    Alcohol use: Never    Drug use: Yes     Types: Marijuana     Comment: FOR PAIN     "Sexual activity: Yes       Objective   Vital Signs:  /76   Pulse 83   Temp 98.4 °F (36.9 °C) (Temporal)   Resp 20   Ht 177.8 cm (70\")   Wt 115 kg (253 lb)   SpO2 97%   BMI 36.30 kg/m²   Estimated body mass index is 36.3 kg/m² as calculated from the following:    Height as of this encounter: 177.8 cm (70\").    Weight as of this encounter: 115 kg (253 lb).              Physical Exam  Vitals reviewed.   Constitutional:       Appearance: Normal appearance.   HENT:      Head: Normocephalic.      Nose: Nose normal.      Mouth/Throat:      Mouth: Mucous membranes are moist.   Eyes:      Extraocular Movements: Extraocular movements intact.   Cardiovascular:      Rate and Rhythm: Normal rate and regular rhythm.      Heart sounds: Normal heart sounds.   Pulmonary:      Effort: Pulmonary effort is normal.      Breath sounds: Normal breath sounds.   Musculoskeletal:      Cervical back: Normal range of motion.      Comments: Decreased ROM of lumbar spine, multiple arthralgias w/ movement of shoulders, knees.   Skin:     General: Skin is warm and dry.   Neurological:      General: No focal deficit present.      Mental Status: He is alert and oriented to person, place, and time.   Psychiatric:         Mood and Affect: Mood normal.         Behavior: Behavior normal.        Result Review :                   Assessment and Plan   Diagnoses and all orders for this visit:    1. Essential hypertension (Primary)  -Improved however uncontrolled. Add amlodipine 5 mg, continue losartan 100 mg  -     amLODIPine (NORVASC) 5 MG tablet; Take 1 tablet by mouth Daily.  Dispense: 30 tablet; Refill: 1    2. Dyslipidemia  -Continue lipitor    3. Rheumatoid arthritis, involving unspecified site, unspecified whether rheumatoid factor present  -     TiZANidine (ZANAFLEX) 2 MG capsule; Take 1 capsule by mouth 3 (Three) Times a Day.  Dispense: 90 capsule; Refill: 0  -     Miscellaneous DME->Cane  -     Miscellaneous DME->Knee " brace  -Re[facility]    4. Personal history of tobacco use, presenting hazards to health  -     CT Chest Low Dose Wo; Future    5. Nicotine dependence, other tobacco product, uncomplicated  -     CT Chest Low Dose Wo; Future    6. Colon cancer screening  -     Cologuard - Stool, Per Rectum; Future    7. Chronic hepatitis C without hepatic coma  -Pt to see GI    8. Right inguinal hernia  9. Umbilical hernia without obstruction and without gangrene  -Pt to see General Surgery      FU 4 weeks         I spent 40 minutes caring for Arsen on this date of service. This time includes time spent by me in the following activities:preparing for the visit, reviewing tests, obtaining and/or reviewing a separately obtained history, performing a medically appropriate examination and/or evaluation , counseling and educating the patient/family/caregiver, ordering medications, tests, or procedures, referring and communicating with other health care professionals , documenting information in the medical record, independently interpreting results and communicating that information with the patient/family/caregiver, and care coordination  EMR Dragon/Transcription disclaimer:   Much of this encounter note is an electronic transcription/translation of spoken language to printed text. The electronic translation of spoken language may permit erroneous, or at times, nonsensical words or phrases to be inadvertently transcribed; although attempts have made to review the note for such errors, some may still exist. Please excuse any unrecognized transcription errors and contact us if the air is unintelligible or needs documented correction. Also, portions of this note have been copied forward, however, changed to reflect the most current clinical status of this patient.  Follow Up   Return in 1 month (on 5/25/2024).  Patient was given instructions and counseling regarding his condition or for health maintenance  advice. Please see specific information pulled into the AVS if appropriate.

## 2024-04-25 NOTE — PROGRESS NOTES
" Low-Dose Lung Cancer CT Screening Visit    CHIEF COMPLAINT:    Shared Decision Making  I am discussing tobacco cessation today with Arsen Calderón    SMOKING HISTORY:     Social History     Tobacco Use   Smoking Status Every Day   • Current packs/day: 1.00   • Average packs/day: 1 pack/day for 21.0 years (21.0 ttl pk-yrs)   • Types: Cigarettes   Smokeless Tobacco Never       SUBJECTIVE:     Arsen Calderón is currently smoking {0-42:22179} pack(s) per day with a  *** pack year history.  Reports no use of alternate forms of tobacco, electronic cigarettes, marijuana or other substances.  Based on the recommendation of the [place] Preventive Services Task Force, this patient is at high risk for lung cancer and a low-dose CT screening scan is recommended.     The patient has had no hemoptysis, unintentional weight loss or increasing shortness of breath. The patient is asymptomatic and has no signs or symptoms of lung cancer.     Together we discussed the potential benefits and potential harms of being screened for lung cancer including the potential for follow up diagnostic testing, risk for over diagnosis, false positive rate and radiation exposure using the [facility] Lung Cancer Screening Shared Decision-Making Tool. A copy of this decision aid resource has been provided in the after visit summary.  We also reviewed the patient's smoking history and counseled him on the importance and health benefits of stopping the use of tobacco products.      OBJECTIVE:    /76   Pulse 83   Temp 98.4 °F (36.9 °C) (Temporal)   Resp 20   Ht 177.8 cm (70\")   Wt 115 kg (253 lb)   SpO2 97%   BMI 36.30 kg/m²   General: no distress, alert and oriented  Chest: Lung sounds are clear to auscultation, no wheezes, rales or rhonchi, with symmetric air entry. No respiratory distress  Cardiovascular: RRR with no murmur auscultated      Smoking Cessation discussion:     We discussed that there are a number of resources and " interventions to assist with smoking cessation if needed in the future.   On a scale of zero to ten, the patient rates their motivation to quit at a *** out of 10 today.  Referral to stop smoking class has been offered. Medication options for tobacco cessation have been discussed with the patient.     Recommendations for continued lung cancer screening:      We discussed the NCCN guidelines for lung cancer screening and the patient verbalized understanding that annual screening is recommended until fifteen years beyond smoking as long as they have no other disease or comorbidity that would prevent them from receiving cancer treatments such as surgery should a lung cancer be detected.  After review of the NCCN guidelines and recommendations for ongoing screening, the patient verbalized understanding of recommendations for follow-up.  The patient {HAS:41025} decided to proceed with a Low Dose Lung Cancer Screening CT today.      {0-42:36132} minutes face-to-face spent counseling patient on the continued health benefits of having quit tobacco, maintaining smoking abstinence, smoking cessation strategies and resources, including the 1-800-Quit-Now referenced in the AVS, as well as the importance of adherence to annual lung cancer low-dose CT screening.

## 2024-04-30 ENCOUNTER — TELEPHONE (OUTPATIENT)
Dept: FAMILY MEDICINE CLINIC | Facility: CLINIC | Age: 59
End: 2024-04-30

## 2024-04-30 DIAGNOSIS — G89.29 CHRONIC PAIN OF BOTH KNEES: ICD-10-CM

## 2024-04-30 DIAGNOSIS — R26.9 GAIT ABNORMALITY: ICD-10-CM

## 2024-04-30 DIAGNOSIS — M25.561 CHRONIC PAIN OF BOTH KNEES: ICD-10-CM

## 2024-04-30 DIAGNOSIS — M25.562 CHRONIC PAIN OF BOTH KNEES: ICD-10-CM

## 2024-04-30 DIAGNOSIS — M06.9 RHEUMATOID ARTHRITIS, INVOLVING UNSPECIFIED SITE, UNSPECIFIED WHETHER RHEUMATOID FACTOR PRESENT: Primary | ICD-10-CM

## 2024-04-30 NOTE — TELEPHONE ENCOUNTER
Caller: Arsen Calderón    Relationship: Self    Best call back number: [phone]     What medication are you requesting: PAIN MEDICATION     What are your current symptoms: EXTREME BACK PAIN     Have you had these symptoms before:    [x] Yes  [] No    Have you been treated for these symptoms before:   [x] Yes  [] No    If a prescription is needed, what is your preferred pharmacy and phone number: [place], [place] - [address] - [phone]  - [phone] FX

## 2024-05-02 NOTE — TELEPHONE ENCOUNTER
Called and pt states he has been taking ibuprofen that is currently not helping I made a appt for him to discuss this further with provider

## 2024-05-03 ENCOUNTER — OFFICE VISIT (OUTPATIENT)
Dept: FAMILY MEDICINE CLINIC | Facility: CLINIC | Age: 59
End: 2024-05-03
Payer: MEDICAID

## 2024-05-03 VITALS
HEIGHT: 70 IN | HEART RATE: 83 BPM | DIASTOLIC BLOOD PRESSURE: 82 MMHG | WEIGHT: 251 LBS | OXYGEN SATURATION: 98 % | RESPIRATION RATE: 20 BRPM | SYSTOLIC BLOOD PRESSURE: 150 MMHG | TEMPERATURE: 97.7 F | BODY MASS INDEX: 35.93 KG/M2

## 2024-05-03 DIAGNOSIS — M25.562 CHRONIC PAIN OF BOTH KNEES: ICD-10-CM

## 2024-05-03 DIAGNOSIS — M15.9 PRIMARY OSTEOARTHRITIS INVOLVING MULTIPLE JOINTS: Primary | ICD-10-CM

## 2024-05-03 DIAGNOSIS — G89.29 CHRONIC PAIN OF BOTH KNEES: ICD-10-CM

## 2024-05-03 DIAGNOSIS — M25.561 CHRONIC PAIN OF BOTH KNEES: ICD-10-CM

## 2024-05-03 DIAGNOSIS — I10 ESSENTIAL HYPERTENSION: ICD-10-CM

## 2024-05-03 DIAGNOSIS — M06.9 RHEUMATOID ARTHRITIS, INVOLVING UNSPECIFIED SITE, UNSPECIFIED WHETHER RHEUMATOID FACTOR PRESENT: ICD-10-CM

## 2024-05-03 PROCEDURE — 3079F DIAST BP 80-89 MM HG: CPT | Performed by: STUDENT IN AN ORGANIZED HEALTH CARE EDUCATION/TRAINING PROGRAM

## 2024-05-03 PROCEDURE — 3077F SYST BP >= 140 MM HG: CPT | Performed by: STUDENT IN AN ORGANIZED HEALTH CARE EDUCATION/TRAINING PROGRAM

## 2024-05-03 PROCEDURE — 99214 OFFICE O/P EST MOD 30 MIN: CPT | Performed by: STUDENT IN AN ORGANIZED HEALTH CARE EDUCATION/TRAINING PROGRAM

## 2024-05-03 RX ORDER — TRAMADOL HYDROCHLORIDE 50 MG/1
100 TABLET ORAL EVERY 6 HOURS PRN
Qty: 60 TABLET | Refills: 0 | Status: SHIPPED | OUTPATIENT
Start: 2024-05-03 | End: 2024-06-02

## 2024-05-03 NOTE — PROGRESS NOTES
Chief Complaint  pain     Subjective        Arsen Calderón presents to [facility]    HPI    Back pain  -Pt requesting help w/ pain control. Has polyarthralgias 2/2 OA as well as RA. He sees pain management later this month, whom he's seen before. He saw Dr. Tripp and says xrays done and will follow up with her. No other treatment plans yet.  He is taking zanaflex and gabapentin, still having issues sleeping and maintaining comfortable positions. A lot of his pain is in back. Evidence of renal insufficiency in labs so avoiding nsaids right now.    Past Medical History:   Diagnosis Date    Bell's palsy     Bipolar 1 disorder     Concussion     COPD (chronic obstructive pulmonary disease)     Hepatitis C     Hernia, abdominal     Hypertension     Lupus     Multiple personality     Neuropathy     Paranoid schizophrenia     Rheumatoid arthritis     Shoulder pain     Testicular pain      Past Surgical History:   Procedure Laterality Date    INGUINAL HERNIA REPAIR Right 6/10/2021    Procedure: ROBOTIC RIGHT INGUINAL HERNIA REPAIR WITH POSSIBLE MESH;  Surgeon: Molly Jean MD;  Location:  [facility];  Service: Robotics - DaVinci;  Laterality: Right;    UMBILICAL HERNIA REPAIR N/A 6/10/2021    Procedure: ROBOTIC UMBILICAL HERNIA REPAIR WITH POSSIBLE MESH, ROBOTIC RIGHT INGUINAL HERNIA REPAIR WITH POSSIBLE MESH;  Surgeon: Molly Jean MD;  Location:  [facility];  Service: Robotics - DaVinci;  Laterality: N/A;     Social History     Socioeconomic History    Marital status: Single   Tobacco Use    Smoking status: Every Day     Current packs/day: 1.00     Average packs/day: 1 pack/day for 21.0 years (21.0 ttl pk-yrs)     Types: Cigarettes    Smokeless tobacco: Never   Vaping Use    Vaping status: Every Day   Substance and Sexual Activity    Alcohol use: Never    Drug use: Yes     Types: Marijuana     Comment: FOR PAIN    Sexual activity: Yes       Objective   Vital Signs:  BP  "150/82   Pulse 83   Temp 97.7 °F (36.5 °C) (Temporal)   Resp 20   Ht 177.8 cm (70\")   Wt 114 kg (251 lb)   SpO2 98%   BMI 36.01 kg/m²   Estimated body mass index is 36.01 kg/m² as calculated from the following:    Height as of this encounter: 177.8 cm (70\").    Weight as of this encounter: 114 kg (251 lb).              Physical Exam  Vitals reviewed.   Constitutional:       Appearance: Normal appearance.   HENT:      Head: Normocephalic.      Nose: Nose normal.      Mouth/Throat:      Mouth: Mucous membranes are moist.   Eyes:      Extraocular Movements: Extraocular movements intact.   Cardiovascular:      Rate and Rhythm: Normal rate and regular rhythm.      Heart sounds: Normal heart sounds.   Pulmonary:      Effort: Pulmonary effort is normal.      Breath sounds: Normal breath sounds.   Musculoskeletal:         General: Normal range of motion.      Cervical back: Normal range of motion.      Comments: Decreased lumbar ROM 2/2 pain   Skin:     General: Skin is warm and dry.   Neurological:      General: No focal deficit present.      Mental Status: He is alert and oriented to person, place, and time.   Psychiatric:         Mood and Affect: Mood normal.         Behavior: Behavior normal.        Result Review :                   Assessment and Plan   Diagnoses and all orders for this visit:    1. Primary osteoarthritis involving multiple joints (Primary)  2. Chronic pain of both knees  -Pt has appointment with pain management, will help w/ pain control until then.     3. Rheumatoid arthritis, involving unspecified site, unspecified whether rheumatoid factor present  -Workup/evaluation with rheumatology ongoing  -     traMADol (ULTRAM) 50 MG tablet; Take 2 tablets by mouth Every 6 (Six) Hours As Needed for Moderate Pain for up to 30 days.  Dispense: 60 tablet; Refill: 0    4. Essential hypertension  -Elevated, partly 2/2 pain, also amlodipine 5 mg added last week. Will evaluate again at next scheduled " visit.           EMR Dragon/Transcription disclaimer:   Much of this encounter note is an electronic transcription/translation of spoken language to printed text. The electronic translation of spoken language may permit erroneous, or at times, nonsensical words or phrases to be inadvertently transcribed; although attempts have made to review the note for such errors, some may still exist. Please excuse any unrecognized transcription errors and contact us if the air is unintelligible or needs documented correction. Also, portions of this note have been copied forward, however, changed to reflect the most current clinical status of this patient.  Follow Up   No follow-ups on file.  Patient was given instructions and counseling regarding his condition or for health maintenance advice. Please see specific information pulled into the AVS if appropriate.

## 2024-05-07 ENCOUNTER — TELEPHONE (OUTPATIENT)
Dept: FAMILY MEDICINE CLINIC | Facility: CLINIC | Age: 59
End: 2024-05-07

## 2024-05-07 NOTE — TELEPHONE ENCOUNTER
Hub staff attempted to follow warm transfer process and was unsuccessful     Caller: Arsen Calderón    Relationship to patient: Self    Best call back number: [phone]     Patient is needing: PATIENT STATES HE IS TRYING TO CALL CHANO BACK ABOUT MEDICATION OVER THE INSURANCE.

## 2024-05-30 ENCOUNTER — TELEPHONE (OUTPATIENT)
Dept: FAMILY MEDICINE CLINIC | Facility: CLINIC | Age: 59
End: 2024-05-30
Payer: MEDICAID

## 2024-05-30 NOTE — TELEPHONE ENCOUNTER
Sent a enStaget message to pt regarding an overdue imaging order for CT of chest which has not been completed.

## 2024-12-16 ENCOUNTER — TELEPHONE (OUTPATIENT)
Dept: FAMILY MEDICINE CLINIC | Facility: CLINIC | Age: 59
End: 2024-12-16
Payer: MEDICAID

## (undated) DEVICE — SUT MNCRYL 4/0 PS2 27IN UD MCP426H

## (undated) DEVICE — ELECTRD BLD EZ CLN MOD XLNG 2.75IN

## (undated) DEVICE — ABSORBABLE WOUND CLOSURE DEVICE: Brand: V-LOC 180

## (undated) DEVICE — ST TBG AIRSEAL FLTR TRI LUM

## (undated) DEVICE — DRSNG SURESITE HNDL 4X5

## (undated) DEVICE — CANNULA SEAL

## (undated) DEVICE — KT CLN CLEANOR SCPE

## (undated) DEVICE — TIP COVER ACCESSORY

## (undated) DEVICE — PENCL ES MEGADINE EZ/CLEAN BUTN W/HOLSTR 10FT

## (undated) DEVICE — ENDOPATH XCEL BLUNT TIP TROCARS WITH SMOOTH SLEEVES: Brand: ENDOPATH XCEL

## (undated) DEVICE — GLV SURG SENSICARE W/ALOE PF LF SZ6 STRL

## (undated) DEVICE — NDL HYPO PRECISIONGLIDE REG 22G 1 1/2

## (undated) DEVICE — SYR LL TP 10ML STRL

## (undated) DEVICE — SYS CLOSE PORTII CARTR/THOMASN XL

## (undated) DEVICE — PK TURNOVER RM ADV

## (undated) DEVICE — DAVINCI: Brand: MEDLINE INDUSTRIES, INC.

## (undated) DEVICE — ADHS SKIN PREMIERPRO EXOFIN TOPICAL HI/VISC .5ML

## (undated) DEVICE — GLV SURG SENSICARE W/ALOE PF LF 6.5 STRL

## (undated) DEVICE — ARM DRAPE

## (undated) DEVICE — REDUCER: Brand: ENDOWRIST

## (undated) DEVICE — SEAL

## (undated) DEVICE — BLADELESS OBTURATOR: Brand: WECK VISTA

## (undated) DEVICE — ANTIBACTERIAL UNDYED BRAIDED (POLYGLACTIN 910), SYNTHETIC ABSORBABLE SUTURE: Brand: COATED VICRYL